# Patient Record
Sex: FEMALE | Race: WHITE | NOT HISPANIC OR LATINO | Employment: OTHER | URBAN - METROPOLITAN AREA
[De-identification: names, ages, dates, MRNs, and addresses within clinical notes are randomized per-mention and may not be internally consistent; named-entity substitution may affect disease eponyms.]

---

## 2017-01-03 ENCOUNTER — PATIENT OUTREACH (OUTPATIENT)
Dept: HEALTH INFORMATION MANAGEMENT | Facility: OTHER | Age: 82
End: 2017-01-03

## 2017-08-15 ENCOUNTER — RX ONLY (OUTPATIENT)
Age: 82
Setting detail: RX ONLY
End: 2017-08-15

## 2017-08-15 PROBLEM — C44.311 BASAL CELL CARCINOMA OF SKIN OF NOSE: Status: ACTIVE | Noted: 2017-08-15

## 2017-08-29 PROBLEM — D49.2 NEOPLASM OF UNSPECIFIED BEHAVIOR OF BONE, SOFT TISSUE, AND SKIN: Status: RESOLVED | Noted: 2017-08-15 | Resolved: 2017-08-29

## 2017-11-18 ENCOUNTER — RESOLUTE PROFESSIONAL BILLING HOSPITAL PROF FEE (OUTPATIENT)
Dept: HOSPITALIST | Facility: MEDICAL CENTER | Age: 82
End: 2017-11-18
Payer: MEDICARE

## 2017-11-18 ENCOUNTER — HOSPITAL ENCOUNTER (INPATIENT)
Facility: MEDICAL CENTER | Age: 82
LOS: 2 days | DRG: 394 | End: 2017-11-21
Attending: EMERGENCY MEDICINE | Admitting: INTERNAL MEDICINE
Payer: MEDICARE

## 2017-11-18 DIAGNOSIS — R19.7 DIARRHEA, UNSPECIFIED TYPE: ICD-10-CM

## 2017-11-18 PROBLEM — R79.89 PRERENAL AZOTEMIA: Status: ACTIVE | Noted: 2017-11-18

## 2017-11-18 LAB
ANION GAP SERPL CALC-SCNC: 9 MMOL/L (ref 0–11.9)
BASOPHILS # BLD AUTO: 0.2 % (ref 0–1.8)
BASOPHILS # BLD: 0.03 K/UL (ref 0–0.12)
BUN SERPL-MCNC: 22 MG/DL (ref 8–22)
CALCIUM SERPL-MCNC: 9.3 MG/DL (ref 8.5–10.5)
CHLORIDE SERPL-SCNC: 106 MMOL/L (ref 96–112)
CO2 SERPL-SCNC: 19 MMOL/L (ref 20–33)
CREAT SERPL-MCNC: 1.08 MG/DL (ref 0.5–1.4)
EOSINOPHIL # BLD AUTO: 0.08 K/UL (ref 0–0.51)
EOSINOPHIL NFR BLD: 0.6 % (ref 0–6.9)
ERYTHROCYTE [DISTWIDTH] IN BLOOD BY AUTOMATED COUNT: 44.5 FL (ref 35.9–50)
GFR SERPL CREATININE-BSD FRML MDRD: 47 ML/MIN/1.73 M 2
GLUCOSE SERPL-MCNC: 160 MG/DL (ref 65–99)
HCT VFR BLD AUTO: 44.4 % (ref 37–47)
HGB BLD-MCNC: 14.5 G/DL (ref 12–16)
IMM GRANULOCYTES # BLD AUTO: 0.05 K/UL (ref 0–0.11)
IMM GRANULOCYTES NFR BLD AUTO: 0.4 % (ref 0–0.9)
LYMPHOCYTES # BLD AUTO: 0.2 K/UL (ref 1–4.8)
LYMPHOCYTES NFR BLD: 1.5 % (ref 22–41)
MCH RBC QN AUTO: 29.4 PG (ref 27–33)
MCHC RBC AUTO-ENTMCNC: 32.7 G/DL (ref 33.6–35)
MCV RBC AUTO: 89.9 FL (ref 81.4–97.8)
MONOCYTES # BLD AUTO: 0.64 K/UL (ref 0–0.85)
MONOCYTES NFR BLD AUTO: 4.7 % (ref 0–13.4)
NEUTROPHILS # BLD AUTO: 12.54 K/UL (ref 2–7.15)
NEUTROPHILS NFR BLD: 92.6 % (ref 44–72)
NRBC # BLD AUTO: 0 K/UL
NRBC BLD AUTO-RTO: 0 /100 WBC
PLATELET # BLD AUTO: 363 K/UL (ref 164–446)
PMV BLD AUTO: 8.6 FL (ref 9–12.9)
POTASSIUM SERPL-SCNC: 4.4 MMOL/L (ref 3.6–5.5)
RBC # BLD AUTO: 4.94 M/UL (ref 4.2–5.4)
SODIUM SERPL-SCNC: 134 MMOL/L (ref 135–145)
WBC # BLD AUTO: 13.5 K/UL (ref 4.8–10.8)

## 2017-11-18 PROCEDURE — 700105 HCHG RX REV CODE 258: Performed by: INTERNAL MEDICINE

## 2017-11-18 PROCEDURE — 99220 PR INITIAL OBSERVATION CARE,LEVL III: CPT | Performed by: INTERNAL MEDICINE

## 2017-11-18 PROCEDURE — 36415 COLL VENOUS BLD VENIPUNCTURE: CPT

## 2017-11-18 PROCEDURE — 99285 EMERGENCY DEPT VISIT HI MDM: CPT

## 2017-11-18 PROCEDURE — 87086 URINE CULTURE/COLONY COUNT: CPT

## 2017-11-18 PROCEDURE — 302128 INFUSION PUMP: Performed by: EMERGENCY MEDICINE

## 2017-11-18 PROCEDURE — 85025 COMPLETE CBC W/AUTO DIFF WBC: CPT

## 2017-11-18 PROCEDURE — G0378 HOSPITAL OBSERVATION PER HR: HCPCS

## 2017-11-18 PROCEDURE — 80048 BASIC METABOLIC PNL TOTAL CA: CPT

## 2017-11-18 RX ORDER — ONDANSETRON 4 MG/1
4 TABLET, ORALLY DISINTEGRATING ORAL EVERY 4 HOURS PRN
Status: DISCONTINUED | OUTPATIENT
Start: 2017-11-18 | End: 2017-11-21 | Stop reason: HOSPADM

## 2017-11-18 RX ORDER — POLYETHYLENE GLYCOL 3350 17 G/17G
17 POWDER, FOR SOLUTION ORAL DAILY
COMMUNITY

## 2017-11-18 RX ORDER — AMOXICILLIN 250 MG
1 CAPSULE ORAL DAILY
COMMUNITY

## 2017-11-18 RX ORDER — AMLODIPINE BESYLATE 5 MG/1
5 TABLET ORAL EVERY MORNING
Status: DISCONTINUED | OUTPATIENT
Start: 2017-11-19 | End: 2017-11-21 | Stop reason: HOSPADM

## 2017-11-18 RX ORDER — TRIAMCINOLONE ACETONIDE 1 MG/G
1 CREAM TOPICAL DAILY
COMMUNITY
End: 2018-09-03

## 2017-11-18 RX ORDER — ACETAMINOPHEN 325 MG/1
650 TABLET ORAL EVERY 6 HOURS PRN
Status: DISCONTINUED | OUTPATIENT
Start: 2017-11-18 | End: 2017-11-21 | Stop reason: HOSPADM

## 2017-11-18 RX ORDER — SODIUM CHLORIDE 9 MG/ML
INJECTION, SOLUTION INTRAVENOUS CONTINUOUS
Status: DISCONTINUED | OUTPATIENT
Start: 2017-11-18 | End: 2017-11-21 | Stop reason: HOSPADM

## 2017-11-18 RX ORDER — CEPHALEXIN 500 MG/1
500 CAPSULE ORAL 4 TIMES DAILY
Status: ON HOLD | COMMUNITY
Start: 2017-11-18 | End: 2017-11-21

## 2017-11-18 RX ORDER — ONDANSETRON 2 MG/ML
4 INJECTION INTRAMUSCULAR; INTRAVENOUS EVERY 4 HOURS PRN
Status: DISCONTINUED | OUTPATIENT
Start: 2017-11-18 | End: 2017-11-21 | Stop reason: HOSPADM

## 2017-11-18 RX ADMIN — SODIUM CHLORIDE: 9 INJECTION, SOLUTION INTRAVENOUS at 23:43

## 2017-11-18 ASSESSMENT — CHA2DS2 SCORE
DIABETES: YES
HYPERTENSION: NO
AGE 75 OR GREATER: YES
VASCULAR DISEASE: NO
SEX: FEMALE
AGE 65 TO 74: NO
PRIOR STROKE OR TIA OR THROMBOEMBOLISM: NO
CHA2DS2 VASC SCORE: 4
CHF OR LEFT VENTRICULAR DYSFUNCTION: NO

## 2017-11-18 ASSESSMENT — PATIENT HEALTH QUESTIONNAIRE - PHQ9
1. LITTLE INTEREST OR PLEASURE IN DOING THINGS: NOT AT ALL
SUM OF ALL RESPONSES TO PHQ QUESTIONS 1-9: 0
SUM OF ALL RESPONSES TO PHQ9 QUESTIONS 1 AND 2: 0
2. FEELING DOWN, DEPRESSED, IRRITABLE, OR HOPELESS: NOT AT ALL

## 2017-11-18 ASSESSMENT — PAIN SCALES - GENERAL
PAINLEVEL_OUTOF10: 0
PAINLEVEL_OUTOF10: 0

## 2017-11-18 ASSESSMENT — LIFESTYLE VARIABLES: DO YOU DRINK ALCOHOL: NO

## 2017-11-19 PROBLEM — N17.9 ACUTE KIDNEY INJURY (HCC): Status: ACTIVE | Noted: 2017-11-19

## 2017-11-19 PROBLEM — D72.829 LEUKOCYTOSIS: Status: ACTIVE | Noted: 2017-11-19

## 2017-11-19 LAB
APPEARANCE UR: ABNORMAL
BACTERIA #/AREA URNS HPF: ABNORMAL /HPF
BASOPHILS # BLD AUTO: 0.2 % (ref 0–1.8)
BASOPHILS # BLD: 0.01 K/UL (ref 0–0.12)
BILIRUB UR QL STRIP.AUTO: NEGATIVE
COLOR UR: YELLOW
CULTURE IF INDICATED INDCX: YES UA CULTURE
EOSINOPHIL # BLD AUTO: 0.2 K/UL (ref 0–0.51)
EOSINOPHIL NFR BLD: 3.5 % (ref 0–6.9)
EPI CELLS #/AREA URNS HPF: ABNORMAL /HPF
ERYTHROCYTE [DISTWIDTH] IN BLOOD BY AUTOMATED COUNT: 45.1 FL (ref 35.9–50)
GLUCOSE UR STRIP.AUTO-MCNC: NEGATIVE MG/DL
HCT VFR BLD AUTO: 39.3 % (ref 37–47)
HGB BLD-MCNC: 12.7 G/DL (ref 12–16)
IMM GRANULOCYTES # BLD AUTO: 0.02 K/UL (ref 0–0.11)
IMM GRANULOCYTES NFR BLD AUTO: 0.4 % (ref 0–0.9)
KETONES UR STRIP.AUTO-MCNC: NEGATIVE MG/DL
LEUKOCYTE ESTERASE UR QL STRIP.AUTO: ABNORMAL
LYMPHOCYTES # BLD AUTO: 0.58 K/UL (ref 1–4.8)
LYMPHOCYTES NFR BLD: 10.2 % (ref 22–41)
MCH RBC QN AUTO: 29 PG (ref 27–33)
MCHC RBC AUTO-ENTMCNC: 32.3 G/DL (ref 33.6–35)
MCV RBC AUTO: 89.7 FL (ref 81.4–97.8)
MICRO URNS: ABNORMAL
MONOCYTES # BLD AUTO: 0.56 K/UL (ref 0–0.85)
MONOCYTES NFR BLD AUTO: 9.8 % (ref 0–13.4)
NEUTROPHILS # BLD AUTO: 4.33 K/UL (ref 2–7.15)
NEUTROPHILS NFR BLD: 75.9 % (ref 44–72)
NITRITE UR QL STRIP.AUTO: NEGATIVE
NRBC # BLD AUTO: 0 K/UL
NRBC BLD AUTO-RTO: 0 /100 WBC
PH UR STRIP.AUTO: 5 [PH]
PLATELET # BLD AUTO: 354 K/UL (ref 164–446)
PMV BLD AUTO: 8.8 FL (ref 9–12.9)
PROT UR QL STRIP: >=300 MG/DL
RBC # BLD AUTO: 4.38 M/UL (ref 4.2–5.4)
RBC # URNS HPF: ABNORMAL /HPF
RBC UR QL AUTO: ABNORMAL
RENAL EPI CELLS #/AREA URNS HPF: ABNORMAL /HPF
SP GR UR STRIP.AUTO: 1.01
UROBILINOGEN UR STRIP.AUTO-MCNC: 0.2 MG/DL
WBC # BLD AUTO: 5.7 K/UL (ref 4.8–10.8)
WBC #/AREA URNS HPF: ABNORMAL /HPF

## 2017-11-19 PROCEDURE — A9270 NON-COVERED ITEM OR SERVICE: HCPCS | Performed by: INTERNAL MEDICINE

## 2017-11-19 PROCEDURE — 99232 SBSQ HOSP IP/OBS MODERATE 35: CPT | Performed by: INTERNAL MEDICINE

## 2017-11-19 PROCEDURE — 81001 URINALYSIS AUTO W/SCOPE: CPT

## 2017-11-19 PROCEDURE — 700102 HCHG RX REV CODE 250 W/ 637 OVERRIDE(OP): Performed by: INTERNAL MEDICINE

## 2017-11-19 PROCEDURE — 85025 COMPLETE CBC W/AUTO DIFF WBC: CPT

## 2017-11-19 PROCEDURE — 770021 HCHG ROOM/CARE - ISO PRIVATE

## 2017-11-19 PROCEDURE — 700105 HCHG RX REV CODE 258: Performed by: INTERNAL MEDICINE

## 2017-11-19 PROCEDURE — 36415 COLL VENOUS BLD VENIPUNCTURE: CPT

## 2017-11-19 PROCEDURE — 87086 URINE CULTURE/COLONY COUNT: CPT

## 2017-11-19 RX ADMIN — AMLODIPINE BESYLATE 5 MG: 5 TABLET ORAL at 09:34

## 2017-11-19 RX ADMIN — SODIUM CHLORIDE: 9 INJECTION, SOLUTION INTRAVENOUS at 11:45

## 2017-11-19 RX ADMIN — APIXABAN 2.5 MG: 2.5 TABLET, FILM COATED ORAL at 09:34

## 2017-11-19 RX ADMIN — APIXABAN 2.5 MG: 2.5 TABLET, FILM COATED ORAL at 21:07

## 2017-11-19 ASSESSMENT — PAIN SCALES - GENERAL
PAINLEVEL_OUTOF10: 0

## 2017-11-19 ASSESSMENT — CHA2DS2 SCORE
PRIOR STROKE OR TIA OR THROMBOEMBOLISM: NO
AGE 65 TO 74: NO
DIABETES: NO
CHF OR LEFT VENTRICULAR DYSFUNCTION: NO
AGE 75 OR GREATER: YES
SEX: FEMALE
HYPERTENSION: YES
CHA2DS2 VASC SCORE: 4
VASCULAR DISEASE: NO

## 2017-11-19 NOTE — ASSESSMENT & PLAN NOTE
- Was recently on antibiotics  - Few bacteria but does have white blood cells as well as leukocyte esterase  - Mentation does seem improved, likely near her baseline, she has a normal white blood cell count and is afebrile  - Was having diarrhea with concern for C. diff so did not start any antibiotics, at this point in time I don't think antibiotics are urgently required, will hold off, if culture is positive start antibiotics then

## 2017-11-19 NOTE — PROGRESS NOTES
Pt is a&o to self. Pt is currently stable in room and is on contact for C. Diff until it is ruled out. Pt is able to guidry & follow simple commands. PERRLA. Pt turns frequently turns in bed. Clear lung sounds.

## 2017-11-19 NOTE — ASSESSMENT & PLAN NOTE
- On recent antibiotics, so initially concerning for C. Diff  - However patient was also on stool softeners  -Patient has not had a single bowel movement since she's been here, certainly does not have the exam is insistent with toxic megacolon so therefore no C. Diff  - Likely secondary to excessive stool softeners

## 2017-11-19 NOTE — PROGRESS NOTES
Patient arrived to unit on a stretcher. Skin check performed with Charge nurse. Pt has fragile, thin skin. Pt has a cut on the L posterior lower calf with blanchable redness around the site. Sacrum intact.

## 2017-11-19 NOTE — CARE PLAN
Problem: Safety  Goal: Will remain free from falls    Intervention: Assess risk factors for falls  Goal partially met with bed alarm on.       Problem: Knowledge Deficit  Goal: Knowledge of disease process/condition, treatment plan, diagnostic tests, and medications will improve    Intervention: Assess knowledge level of disease process/condition, treatment plan, diagnostic tests, and medications  Goal not met.

## 2017-11-19 NOTE — H&P
Hospital Medicine History and Physical    Date of Service  11/18/2017    Chief Complaint  Chief Complaint   Patient presents with   • Diarrhea   • Nausea       History of Presenting Illness  89 y.o. female  Who resides at [facility] who presented 11/18/2017 with diarrhea. Patient was recently started on Kelfex for UTI, no UA or culture data accompanies her. [facility] will not accept patient back unless C dif can be r/o.  It is noted that patient is on Multiple laxatives. She is pleasantly demented, denies physical complaints, thinks she is in the [place] with her parents currently.    Primary Care Physician  Denver J Miller, M.D.    Consultants  none    Code Status  DNR per POLST    Review of Systems  Review of Systems   Unable to perform ROS: Dementia        Past Medical History  Past Medical History:   Diagnosis Date   • Acute renal failure (ARF) (CMS-HCC) 3/1/2015   • UTI (urinary tract infection) 2/19/2015   • Diabetes mellitus type 2 in obese (CMS-HCC) 8/15/2013   • Cancer (CMS-HCC)     skin Ca removed   • Dementia, old age    • Hearing disorder    • Hypertension    • Macular degeneration    • MEDICAL HOME        Surgical History  Past Surgical History:   Procedure Laterality Date   • HIP HEMIARTHROPLASTY  2/21/2015    Performed by Brian Calixto M.D. at SURGERY [facility] ORS   • HYSTERECTOMY, TOTAL ABDOMINAL     • OTHER      TONSILLS, EAR SURG   • OTHER ABDOMINAL SURGERY         Medications  No current facility-administered medications on file prior to encounter.      Current Outpatient Prescriptions on File Prior to Encounter   Medication Sig Dispense Refill   • apixaban (ELIQUIS) 2.5mg Tab Take 2.5 mg by mouth 2 Times a Day.     • famotidine (PEPCID) 20 MG Tab Take 20 mg by mouth every day.     • Calcium Carb-Cholecalciferol (OYSTER SHELL CALCIUM/VITAMIN D) 250-125 MG-UNIT Tab tablet Take 1 Tab by mouth 2 Times a Day.     • vitamin D (CHOLECALCIFEROL) 1000 UNIT Tab Take 2,000 Units by mouth every  morning.     • lisinopril (PRINIVIL, ZESTRIL) 30 MG tablet Take 30 mg by mouth every morning.     • amlodipine (NORVASC) 5 MG TABS Take 5 mg by mouth every morning.     • Multiple Vitamins-Minerals (OCUVITE) TABS Take 1 Tab by mouth every morning.         Family History  Family History   Problem Relation Age of Onset   • Cancer Father    • Psychiatry Brother        Social History  Social History   Substance Use Topics   • Smoking status: Never Smoker   • Smokeless tobacco: Never Used   • Alcohol use No       Allergies  Allergies   Allergen Reactions   • Sulfa Drugs         Physical Exam  Laboratory   Hemodynamics  Temp (24hrs), Av.6 °C (99.7 °F), Min:37.6 °C (99.7 °F), Max:37.6 °C (99.7 °F)   Temperature: 37.6 °C (99.7 °F)  Pulse  Av.9  Min: 68  Max: 79 Heart Rate (Monitored): 68  Blood Pressure : 119/73, NIBP: (!) 97/39      Respiratory      Respiration: 14, Pulse Oximetry: 91 %             Physical Exam   Constitutional: She appears well-developed. No distress.   Frail, thin   HENT:   Head: Normocephalic and atraumatic.   Mouth/Throat: Oropharynx is clear and moist.   Eyes: EOM are normal. Pupils are equal, round, and reactive to light. Right eye exhibits no discharge. Left eye exhibits no discharge. No scleral icterus.   Neck: Neck supple.   Cardiovascular: Normal rate.    irreg   Pulmonary/Chest: Effort normal and breath sounds normal.   Abdominal: Soft. Bowel sounds are normal. She exhibits no distension. There is no tenderness.   Musculoskeletal: She exhibits no edema or tenderness.   Neurological: She is alert. No cranial nerve deficit.   Skin: Skin is warm and dry. She is not diaphoretic.   Psychiatric:   Pleasant, calm   Nursing note and vitals reviewed.      Recent Labs      17   WBC  13.5*   RBC  4.94   HEMOGLOBIN  14.5   HEMATOCRIT  44.4   MCV  89.9   MCH  29.4   MCHC  32.7*   RDW  44.5   PLATELETCT  363   MPV  8.6*     Recent Labs      17   SODIUM  134*   POTASSIUM   4.4   CHLORIDE  106   CO2  19*   GLUCOSE  160*   BUN  22   CREATININE  1.08   CALCIUM  9.3     Recent Labs      11/18/17   1910   GLUCOSE  160*                 Lab Results   Component Value Date    TROPONINI <0.01 12/23/2016     Urinalysis:    Lab Results  Component Value Date/Time   SPECGRAVITY 1.022 12/23/2016 2045   GLUCOSEUR Negative 12/23/2016 2045   KETONES Negative 12/23/2016 2045   NITRITE Negative 12/23/2016 2045   WBCURINE 2-5 12/23/2016 2045   RBCURINE 5-10 (A) 12/23/2016 2045   BACTERIA Few (A) 12/23/2016 2045   EPITHELCELL Few 12/23/2016 2045        Imaging  none   Assessment/Plan     I anticipate this patient is appropriate for observation status at this time.    Paroxysmal atrial fibrillation (HCC)- (present on admission)   Assessment & Plan    Rate OK        Prerenal azotemia- (present on admission)   Assessment & Plan    Hydrate cautiously        Diarrhea- (present on admission)   Assessment & Plan    c dif pending  Stool softeners and H2 blocker held  atb held           UTI (urinary tract infection)- (present on admission)   Assessment & Plan    Check culture        Dementia- (present on admission)   Assessment & Plan    stable            VTE prophylaxis: Eliquis.

## 2017-11-19 NOTE — PROGRESS NOTES
Pt A&Ox2 (disoriented to time and event), denies any numbness/tingling/pain.    Bed alarm on, call light within reach, pt educated on importance of using the call light when she needs assistance, pt verbalized understanding.

## 2017-11-19 NOTE — ED NOTES
Pt BIB EMS from [facility], in memory care area with c/o diarrhea starting 2 hours ago.  Pt is on Keflex for UTI started 3 days ago and also was dx for yeast infection to her leg.  No c/o abd pain.  Pt notes some nausea.  Pt A/o x1, baseline for pt.  ERP to see.

## 2017-11-19 NOTE — ED NOTES
Pt encouraged to walk to the bathroom, when standing pt states she feels too weak to ambulate, returned to bed.

## 2017-11-19 NOTE — ED PROVIDER NOTES
"ED Provider Note    CHIEF COMPLAINT  Chief Complaint   Patient presents with   • Diarrhea   • Nausea       HPI  Daysi Farmer is a 89 y.o. female who presents For evaluation of diarrhea for the past couple hours, sent by a nursing home where she resides with dementia. The patient is on Keflex for urinary tract infection, she is pleasantly demented and unable to provide any useful information at this time.     REVIEW OF SYSTEMS  Unobtainable secondary to clinical condition.      PAST MEDICAL HISTORY  Past Medical History:   Diagnosis Date   • Acute renal failure (ARF) (CMS-HCC) 3/1/2015   • Cancer (CMS-HCC)     skin Ca removed   • Dementia, old age    • Diabetes mellitus type 2 in obese (CMS-HCC) 8/15/2013   • Hearing disorder    • Hypertension    • Macular degeneration    • MEDICAL HOME    • UTI (urinary tract infection) 2/19/2015       FAMILY HISTORY  Family History   Problem Relation Age of Onset   • Cancer Father    • Psychiatry Brother        SOCIAL HISTORY  Social History   Substance Use Topics   • Smoking status: Never Smoker   • Smokeless tobacco: Never Used   • Alcohol use No       SURGICAL HISTORY  Past Surgical History:   Procedure Laterality Date   • HIP HEMIARTHROPLASTY  2/21/2015    Performed by Brian Calixto M.D. at SURGERY [facility] ORS   • HYSTERECTOMY, TOTAL ABDOMINAL     • OTHER      TONSILLS, EAR SURG   • OTHER ABDOMINAL SURGERY         CURRENT MEDICATIONS  I personally reviewed the medication list in the charting documentation.     ALLERGIES  Allergies   Allergen Reactions   • Sulfa Drugs        MEDICAL RECORD  I have reviewed patient's medical record and pertinent results are listed above.      PHYSICAL EXAM  VITAL SIGNS: /73   Pulse 77   Temp 37.6 °C (99.7 °F)   Resp 16   Ht 1.626 m (5' 4\")   Wt 59 kg (130 lb)   LMP 12/24/1960   SpO2 94%   BMI 22.31 kg/m²    Constitutional:Elderly and frail but overall well-appearing, no acute distress, quite pleasant.  HENT: Mucus " membranes moist.    Eyes: No scleral icterus. Normal conjunctiva   Neck: Supple, comfortable, nonpainful range of motion.   Cardiovascular: Regular heart rate and rhythm.   Thorax & Lungs: Chest is nontender.  Lungs are clear to auscultation with good air movement bilaterally.  No wheeze, rhonchi, nor rales.   Abdomen: Soft, with no tenderness, rebound nor guarding.  No mass or pulsatile mass appreciated.  Skin: Warm, dry. No rash appreciated  Extremities/Musculoskeletal: No sign of trauma. No asymmetric calf tenderness, erythema or edema. Normal range of motion   Neurologic: Alert & disoriented. His focal deficits.    DIAGNOSTIC STUDIES / PROCEDURES    LABS  Results for orders placed or performed during the hospital encounter of 11/18/17   CBC WITH DIFFERENTIAL   Result Value Ref Range    WBC 13.5 (H) 4.8 - 10.8 K/uL    RBC 4.94 4.20 - 5.40 M/uL    Hemoglobin 14.5 12.0 - 16.0 g/dL    Hematocrit 44.4 37.0 - 47.0 %    MCV 89.9 81.4 - 97.8 fL    MCH 29.4 27.0 - 33.0 pg    MCHC 32.7 (L) 33.6 - 35.0 g/dL    RDW 44.5 35.9 - 50.0 fL    Platelet Count 363 164 - 446 K/uL    MPV 8.6 (L) 9.0 - 12.9 fL    Neutrophils-Polys 92.60 (H) 44.00 - 72.00 %    Lymphocytes 1.50 (L) 22.00 - 41.00 %    Monocytes 4.70 0.00 - 13.40 %    Eosinophils 0.60 0.00 - 6.90 %    Basophils 0.20 0.00 - 1.80 %    Immature Granulocytes 0.40 0.00 - 0.90 %    Nucleated RBC 0.00 /100 WBC    Neutrophils (Absolute) 12.54 (H) 2.00 - 7.15 K/uL    Lymphs (Absolute) 0.20 (L) 1.00 - 4.80 K/uL    Monos (Absolute) 0.64 0.00 - 0.85 K/uL    Eos (Absolute) 0.08 0.00 - 0.51 K/uL    Baso (Absolute) 0.03 0.00 - 0.12 K/uL    Immature Granulocytes (abs) 0.05 0.00 - 0.11 K/uL    NRBC (Absolute) 0.00 K/uL   BASIC METABOLIC PANEL   Result Value Ref Range    Sodium 134 (L) 135 - 145 mmol/L    Potassium 4.4 3.6 - 5.5 mmol/L    Chloride 106 96 - 112 mmol/L    Co2 19 (L) 20 - 33 mmol/L    Glucose 160 (H) 65 - 99 mg/dL    Bun 22 8 - 22 mg/dL    Creatinine 1.08 0.50 - 1.40 mg/dL     Calcium 9.3 8.5 - 10.5 mg/dL    Anion Gap 9.0 0.0 - 11.9   ESTIMATED GFR   Result Value Ref Range    GFR If  57 (A) >60 mL/min/1.73 m 2    GFR If Non  47 (A) >60 mL/min/1.73 m 2         COURSE & MEDICAL DECISION MAKING  I have reviewed any medical record information, laboratory studies and radiographic results as noted above.  Differential diagnoses includes: Dehydration, electro-lyte abnormalities, C. diff, medication reaction    Encounter Summary: This is a 89 y.o. female with diarrhea after starting Keflex for UTI, sent here by her memory [facility] nursing home, she is unable to provide much history secondary to her dementia. She looks well overall on exam. We'll check blood work. Will also have our nursing staff contact the nursing staff at her nursing home for further information about the patient ------ nursing home reports that given the risk of C. diff colitis and unable to take her back until PCR is negative or she is being treated for C. diff. Will be admitted for further evaluation.      DISPOSITION: Admitted in guarded condition      FINAL IMPRESSION  1. Diarrhea, unspecified type           This dictation was created using voice recognition software. The accuracy of the dictation is limited to the abilities of the software. I expect there may be some errors of grammar and possibly content. The nursing notes were reviewed and certain aspects of this information were incorporated into this note.    Electronically signed by: Ernesto Ibarra, 11/18/2017 6:59 PM

## 2017-11-19 NOTE — PROGRESS NOTES
[facility] Hospitalist Progress Note    Date of Service: 2017    Chief Complaint  89 y.o. female admitted 2017 with diarrhea.    Interval Problem Update  Patient resides at a memory care facility, she does not remember what brought here to the hospital.  Patient does have confusion at baseline from her dementia, unknown baseline.  C. diff sample and urinalysis pending.  Discussed plan of patient condition with nurse at bedside.    Consultants/Specialty  None    Disposition  Patient requires additional treatment in the hospital, hopefully will be able to go back to her memory care upon discharge        Review of Systems   Unable to perform ROS: Dementia      Physical Exam  Laboratory/Imaging   Hemodynamics  Temp (24hrs), Av.6 °C (97.9 °F), Min:36 °C (96.8 °F), Max:37.6 °C (99.7 °F)   Temperature: 36.4 °C (97.6 °F)  Pulse  Av.6  Min: 68  Max: 86 Heart Rate (Monitored): 71  Blood Pressure : 121/44, NIBP: 118/40      Respiratory      Respiration: 12, Pulse Oximetry: 93 %             Fluids  No intake or output data in the 24 hours ending 17 0728    Nutrition  Orders Placed This Encounter   Procedures   • Diet Order     Standing Status:   Standing     Number of Occurrences:   1     Order Specific Question:   Diet:     Answer:   Consistent Carbohydrate [4]     Order Specific Question:   Texture/Fiber modifications:     Answer:   Dysphagia 3(Mechanical Soft)specify fluid consistency(question 6) [3]     Order Specific Question:   Consistency/Fluid modifications:     Answer:   Thin Liquids [3]     Physical Exam   Constitutional: No distress.   Frail appearing    HENT:   Head: Normocephalic and atraumatic.   Mouth/Throat: No oropharyngeal exudate.   Eyes: Right eye exhibits no discharge. Left eye exhibits no discharge.   Neck: Neck supple. No tracheal deviation present.   Cardiovascular: Normal rate and regular rhythm.  Exam reveals no gallop and no friction rub.    No murmur heard.  Pulmonary/Chest:  Effort normal and breath sounds normal. No stridor. No respiratory distress. She has no wheezes. She has no rales.   Abdominal: Soft. Bowel sounds are normal. She exhibits no distension.   Musculoskeletal: She exhibits no edema.   Lymphadenopathy:     She has no cervical adenopathy.   Neurological:   Awake but confused    Skin: Skin is warm and dry. No rash noted. She is not diaphoretic. No erythema.   Psychiatric: She is slowed. Cognition and memory are impaired.   Nursing note and vitals reviewed.      Recent Labs      11/18/17 1910   WBC  13.5*   RBC  4.94   HEMOGLOBIN  14.5   HEMATOCRIT  44.4   MCV  89.9   MCH  29.4   MCHC  32.7*   RDW  44.5   PLATELETCT  363   MPV  8.6*     Recent Labs      11/18/17 1910   SODIUM  134*   POTASSIUM  4.4   CHLORIDE  106   CO2  19*   GLUCOSE  160*   BUN  22   CREATININE  1.08   CALCIUM  9.3                      Assessment/Plan     Diarrhea- (present on admission)   Assessment & Plan    - On recent antibiotics, concerning for C. Diff  - However patient was also on stool softeners  - Await C. diff culture, if she does have worsening start oral vancomycin          Paroxysmal atrial fibrillation (HCC)- (present on admission)   Assessment & Plan    - Currently normal sinus rhythm        Leukocytosis   Assessment & Plan    - Concerning for infectious cause of her diarrhea but could also be reactive  - Repeat CBC in the morning        Acute kidney injury (CMS-HCC)   Assessment & Plan    - Mild, likely due to dehydration in patient with diarrhea  - Continue IV fluids  - Repeat BMP in the morning        UTI (urinary tract infection)- (present on admission)   Assessment & Plan    - Awaiting urinalysis as well as urine culture if indicated  - Was recently on antibiotics        Dementia- (present on admission)   Assessment & Plan    - Unknown baseline but this is probably near her baseline, does reside in a memory care facility        Hyponatremia- (present on admission)   Assessment &  Plan    - Mild, likely due to dehydration, continue IV fluids, repeat BMP in the morning            Reviewed items::  Labs reviewed, Medications reviewed and Radiology images reviewed  DVT: eliquis

## 2017-11-19 NOTE — PROGRESS NOTES
Dr. Meyer notified that pt had a MEWS score of 4 this morning but the respiration and pulse were retaken and MEWS score dropped to 2. No further instructions given.

## 2017-11-20 PROBLEM — R80.9 PROTEINURIA: Status: ACTIVE | Noted: 2017-11-20

## 2017-11-20 LAB
ALBUMIN SERPL BCP-MCNC: 3 G/DL (ref 3.2–4.9)
ALBUMIN/GLOB SERPL: 1 G/DL
ALP SERPL-CCNC: 66 U/L (ref 30–99)
ALT SERPL-CCNC: 5 U/L (ref 2–50)
ANION GAP SERPL CALC-SCNC: 7 MMOL/L (ref 0–11.9)
AST SERPL-CCNC: 10 U/L (ref 12–45)
BILIRUB SERPL-MCNC: 0.4 MG/DL (ref 0.1–1.5)
BUN SERPL-MCNC: 10 MG/DL (ref 8–22)
CALCIUM SERPL-MCNC: 8.8 MG/DL (ref 8.5–10.5)
CHLORIDE SERPL-SCNC: 110 MMOL/L (ref 96–112)
CO2 SERPL-SCNC: 20 MMOL/L (ref 20–33)
CREAT SERPL-MCNC: 0.61 MG/DL (ref 0.5–1.4)
GFR SERPL CREATININE-BSD FRML MDRD: >60 ML/MIN/1.73 M 2
GLOBULIN SER CALC-MCNC: 3.1 G/DL (ref 1.9–3.5)
GLUCOSE SERPL-MCNC: 90 MG/DL (ref 65–99)
POTASSIUM SERPL-SCNC: 3.7 MMOL/L (ref 3.6–5.5)
PROT SERPL-MCNC: 6.1 G/DL (ref 6–8.2)
SODIUM SERPL-SCNC: 137 MMOL/L (ref 135–145)

## 2017-11-20 PROCEDURE — 700105 HCHG RX REV CODE 258: Performed by: INTERNAL MEDICINE

## 2017-11-20 PROCEDURE — 80053 COMPREHEN METABOLIC PANEL: CPT

## 2017-11-20 PROCEDURE — A9270 NON-COVERED ITEM OR SERVICE: HCPCS | Performed by: INTERNAL MEDICINE

## 2017-11-20 PROCEDURE — 770001 HCHG ROOM/CARE - MED/SURG/GYN PRIV*

## 2017-11-20 PROCEDURE — 99232 SBSQ HOSP IP/OBS MODERATE 35: CPT | Performed by: INTERNAL MEDICINE

## 2017-11-20 PROCEDURE — 700102 HCHG RX REV CODE 250 W/ 637 OVERRIDE(OP): Performed by: INTERNAL MEDICINE

## 2017-11-20 PROCEDURE — 36415 COLL VENOUS BLD VENIPUNCTURE: CPT

## 2017-11-20 RX ORDER — HYDRALAZINE HYDROCHLORIDE 20 MG/ML
20 INJECTION INTRAMUSCULAR; INTRAVENOUS EVERY 6 HOURS PRN
Status: DISCONTINUED | OUTPATIENT
Start: 2017-11-20 | End: 2017-11-21 | Stop reason: HOSPADM

## 2017-11-20 RX ADMIN — SODIUM CHLORIDE: 9 INJECTION, SOLUTION INTRAVENOUS at 00:52

## 2017-11-20 RX ADMIN — ACETAMINOPHEN 650 MG: 325 TABLET, FILM COATED ORAL at 12:21

## 2017-11-20 RX ADMIN — AMLODIPINE BESYLATE 5 MG: 5 TABLET ORAL at 09:02

## 2017-11-20 RX ADMIN — APIXABAN 2.5 MG: 2.5 TABLET, FILM COATED ORAL at 09:02

## 2017-11-20 RX ADMIN — APIXABAN 2.5 MG: 2.5 TABLET, FILM COATED ORAL at 20:36

## 2017-11-20 RX ADMIN — SODIUM CHLORIDE: 9 INJECTION, SOLUTION INTRAVENOUS at 16:17

## 2017-11-20 ASSESSMENT — PAIN SCALES - GENERAL
PAINLEVEL_OUTOF10: 0
PAINLEVEL_OUTOF10: 3
PAINLEVEL_OUTOF10: 0

## 2017-11-20 NOTE — CARE PLAN
Problem: Bowel/Gastric:  Goal: Will not experience complications related to bowel motility  Outcome: PROGRESSING AS EXPECTED  Pt. Has not had loose stool.     Problem: Skin Integrity  Goal: Risk for impaired skin integrity will decrease  Outcome: PROGRESSING AS EXPECTED  Pt. Q2 turns. States she is unable to mobilize and typically has assistance or is in a wheelchair. Pt. Is incontinent. No signs of incontinence associated dermatitis at this time.

## 2017-11-20 NOTE — PROGRESS NOTES
Pt. A/Ox1, only to self. Needing continual orientation. Denies pain, states some discomfort. IV intact. Poc discussed, bed alarm on, call light within reach.

## 2017-11-20 NOTE — CARE PLAN
Problem: Communication  Goal: The ability to communicate needs accurately and effectively will improve  Pt and pt's family updated on pt's plan of care.    Problem: Skin Integrity  Goal: Risk for impaired skin integrity will decrease  Q2H turns implemented, waffle mattress in place, pt routinely monitored for episodes of incontinence and promptly cleaned up when needed, barrier wipes used.

## 2017-11-20 NOTE — PROGRESS NOTES
When pt was being assessed at [facility], pt stated to RN that she did not feel well. RN took pt's vital signs and SBP was 176. Pt stated she is not in severe pain but she is in pain.. She said pain is 3/10. RN talked to HENRI Tello to notified him about pt's SBP and generalized pain. Per HENRI Tello, he will add PRN antihypertensive drug and add more pain medications for pt. RN retook pt's vitals and SBP is now at 157. Pt still stated that pain is 3/10. Pain medication provided.

## 2017-11-20 NOTE — PROGRESS NOTES
[facility] Hospitalist Progress Note    Date of Service: 2017    Chief Complaint  89 y.o. female admitted 2017 with diarrhea.    Interval Problem Update  Patient resides at a memory care facility, she does not remember what brought here to the hospital.  Patient does have confusion at baseline from her dementia, unknown baseline.  No bowel movement since she's been here, not C. Diff.  Urine culture pending.  Discussed plan of patient condition with nurse at bedside.    Consultants/Specialty  None    Disposition  Patient requires additional treatment in the hospital, hopefully will be able to go back to her memory care upon discharge        Review of Systems   Unable to perform ROS: Dementia      Physical Exam  Laboratory/Imaging   Hemodynamics  Temp (24hrs), Av.6 °C (97.9 °F), Min:36.4 °C (97.6 °F), Max:36.9 °C (98.5 °F)   Temperature: 36.5 °C (97.7 °F)  Pulse  Av.7  Min: 59  Max: 86    Blood Pressure : 150/56      Respiratory      Respiration: 16, Pulse Oximetry: 96 %        RUL Breath Sounds: Clear, RML Breath Sounds: Diminished, RLL Breath Sounds: Clear, PEGGY Breath Sounds: Clear, LLL Breath Sounds: Diminished    Fluids    Intake/Output Summary (Last 24 hours) at 17 0750  Last data filed at 17 0600   Gross per 24 hour   Intake              300 ml   Output                0 ml   Net              300 ml       Nutrition  Orders Placed This Encounter   Procedures   • Diet Order     Standing Status:   Standing     Number of Occurrences:   1     Order Specific Question:   Diet:     Answer:   Consistent Carbohydrate [4]     Order Specific Question:   Texture/Fiber modifications:     Answer:   Dysphagia 3(Mechanical Soft)specify fluid consistency(question 6) [3]     Order Specific Question:   Consistency/Fluid modifications:     Answer:   Thin Liquids [3]     Physical Exam   Constitutional:   Frail appearing    HENT:   Head: Normocephalic and atraumatic.   Eyes: Right eye exhibits no discharge.  Left eye exhibits no discharge. No scleral icterus.   Neck: Neck supple.   Cardiovascular: Normal rate and regular rhythm.    No murmur heard.  Pulmonary/Chest: Effort normal and breath sounds normal. No stridor. No respiratory distress. She has no wheezes.   Abdominal: Soft. Bowel sounds are normal. She exhibits no distension. There is no tenderness.   Musculoskeletal: She exhibits no edema.   Lymphadenopathy:     She has no cervical adenopathy.   Neurological:   Awake but confused    Skin: Skin is warm and dry. She is not diaphoretic. No erythema.   Psychiatric: She is slowed. Cognition and memory are impaired.   Nursing note and vitals reviewed.      Recent Labs      11/18/17 1910 11/19/17   0840   WBC  13.5*  5.7   RBC  4.94  4.38   HEMOGLOBIN  14.5  12.7   HEMATOCRIT  44.4  39.3   MCV  89.9  89.7   MCH  29.4  29.0   MCHC  32.7*  32.3*   RDW  44.5  45.1   PLATELETCT  363  354   MPV  8.6*  8.8*     Recent Labs      11/18/17 1910   SODIUM  134*   POTASSIUM  4.4   CHLORIDE  106   CO2  19*   GLUCOSE  160*   BUN  22   CREATININE  1.08   CALCIUM  9.3                      Assessment/Plan     Diarrhea- (present on admission)   Assessment & Plan    - On recent antibiotics, so initially concerning for C. Diff  - However patient was also on stool softeners  -Patient has not had a single bowel movement since she's been here, certainly does not have the exam is insistent with toxic megacolon so therefore no C. Diff  - Likely secondary to excessive stool softeners          Paroxysmal atrial fibrillation (HCC)- (present on admission)   Assessment & Plan    - Currently normal sinus rhythm        UTI (urinary tract infection)- (present on admission)   Assessment & Plan    - Was recently on antibiotics  - Few bacteria but does have white blood cells as well as leukocyte esterase  - Mentation does seem improved, likely near her baseline, she has a normal white blood cell count and is afebrile  - Was having diarrhea with  concern for C. diff so did not start any antibiotics, at this point in time I don't think antibiotics are urgently required, will hold off, if culture is positive start antibiotics then        Proteinuria   Assessment & Plan    - Significant, albumin is 3  - Kidney function currently seems normal otherwise, occasionally monitor for proteinuria, patient would not benefit from a biopsy or any significant additional workup        Leukocytosis   Assessment & Plan    -Resolved, afebrile        Acute kidney injury (CMS-HCC)   Assessment & Plan    - Resolved with IV fluids         Dementia- (present on admission)   Assessment & Plan    - Unknown baseline but this is probably near her baseline, does reside in a memory care facility        Hyponatremia- (present on admission)   Assessment & Plan    -Resolved with IV fluids will            Reviewed items::  Labs reviewed, Medications reviewed and Radiology images reviewed  DVT: eliquis

## 2017-11-20 NOTE — ASSESSMENT & PLAN NOTE
- Significant, albumin is 3  - Kidney function currently seems normal otherwise, occasionally monitor for proteinuria, patient would not benefit from a biopsy or any significant additional workup

## 2017-11-20 NOTE — PROGRESS NOTES
Pt A&Ox2 (disoriented to time and event), denies any numbness/tingling/pain.    Bed alarm on, call light within reach, pt educated on importance of using the call light when he needs assistance, pt verbalized understanding.

## 2017-11-21 VITALS
WEIGHT: 115 LBS | HEIGHT: 64 IN | TEMPERATURE: 99.3 F | OXYGEN SATURATION: 97 % | DIASTOLIC BLOOD PRESSURE: 65 MMHG | HEART RATE: 98 BPM | BODY MASS INDEX: 19.63 KG/M2 | RESPIRATION RATE: 20 BRPM | SYSTOLIC BLOOD PRESSURE: 144 MMHG

## 2017-11-21 PROBLEM — N17.9 ACUTE KIDNEY INJURY (HCC): Status: RESOLVED | Noted: 2017-11-19 | Resolved: 2017-11-21

## 2017-11-21 PROBLEM — R19.7 DIARRHEA: Status: RESOLVED | Noted: 2017-11-18 | Resolved: 2017-11-21

## 2017-11-21 PROBLEM — R80.9 PROTEINURIA: Status: RESOLVED | Noted: 2017-11-20 | Resolved: 2017-11-21

## 2017-11-21 PROBLEM — D72.829 LEUKOCYTOSIS: Status: RESOLVED | Noted: 2017-11-19 | Resolved: 2017-11-21

## 2017-11-21 LAB
BACTERIA UR CULT: NORMAL
SIGNIFICANT IND 70042: NORMAL
SITE SITE: NORMAL
SOURCE SOURCE: NORMAL

## 2017-11-21 PROCEDURE — 700102 HCHG RX REV CODE 250 W/ 637 OVERRIDE(OP): Performed by: INTERNAL MEDICINE

## 2017-11-21 PROCEDURE — A9270 NON-COVERED ITEM OR SERVICE: HCPCS | Performed by: INTERNAL MEDICINE

## 2017-11-21 PROCEDURE — 99239 HOSP IP/OBS DSCHRG MGMT >30: CPT | Performed by: HOSPITALIST

## 2017-11-21 RX ADMIN — APIXABAN 2.5 MG: 2.5 TABLET, FILM COATED ORAL at 07:55

## 2017-11-21 RX ADMIN — AMLODIPINE BESYLATE 5 MG: 5 TABLET ORAL at 07:55

## 2017-11-21 ASSESSMENT — LIFESTYLE VARIABLES: EVER_SMOKED: NEVER

## 2017-11-21 ASSESSMENT — PAIN SCALES - GENERAL: PAINLEVEL_OUTOF10: 0

## 2017-11-21 NOTE — PROGRESS NOTES
Received report and assumed pt care.  A&O x1.  Bed alarm and treaded socks on.  Call light and personal belongings within reach.  Denies any pain or discomfort.

## 2017-11-21 NOTE — PROGRESS NOTES
RN discussed plan of care with pt's daughter when it was mentioned to her that pt was being treated with Keflex for UTI at the nursing home. Daughter told RN that she was told that the Keflex was for pt's skin tear in her left leg and there was no mention of UTI. Pt's daughter stated she will call and clarify this with the nursing home.

## 2017-11-21 NOTE — CARE PLAN
Problem: Urinary Elimination:  Goal: Ability to reestablish a normal urinary elimination pattern will improve  Outcome: PROGRESSING AS EXPECTED  Pt incontinent of urine.      Problem: Pain Management  Goal: Pain level will decrease to patient's comfort goal  Outcome: PROGRESSING AS EXPECTED  Pt denies pain at this time.

## 2017-11-21 NOTE — DISCHARGE PLANNING
Received transport form from Michelle(PRESTON). Arranged patient's transport to the [facility] at 1630 via [facility]. Michelle(PRESTON) notified.

## 2017-11-21 NOTE — PROGRESS NOTES
2 RN skin check completed.  No medical devices in place.  No pressure ulcers present.  Interventions in place include Q2 turns and waffle cushion.

## 2017-11-21 NOTE — DISCHARGE SUMMARY
CHIEF COMPLAINT ON ADMISSION  Chief Complaint   Patient presents with   • Diarrhea   • Nausea       CODE STATUS  DNR    HPI & HOSPITAL COURSE  This is a 89 y.o. female here with diarrhea, which resolved almost practically on admission. She was followed closely and she had no urinary symptoms. Stool softeners and antibiotics were held. She was much improved to her baseline.      Therefore, she is discharged in fair and stable condition with close outpatient follow-up.    SPECIFIC OUTPATIENT FOLLOW-UP  Resume stool softeners cautiously    DISCHARGE PROBLEM LIST  Active Problems:    Paroxysmal atrial fibrillation (HCC) POA: Yes    Dementia POA: Yes  Resolved Problems:    Diarrhea POA: Yes    UTI (urinary tract infection) POA: Yes    Hyponatremia POA: Yes    Acute kidney injury (CMS-HCC) POA: Yes    Leukocytosis POA: Yes    Proteinuria POA: Yes      FOLLOW UP  No future appointments.  No follow-up provider specified.    MEDICATIONS ON DISCHARGE   Daysi Farmer   Home Medication Instructions NORI:[id number]    Printed on:11/21/17 8086   Medication Information                      amlodipine (NORVASC) 5 MG TABS  Take 5 mg by mouth every morning.             apixaban (ELIQUIS) 2.5mg Tab  Take 2.5 mg by mouth 2 Times a Day.             Calcium Carb-Cholecalciferol (OYSTER SHELL CALCIUM/VITAMIN D) 250-125 MG-UNIT Tab tablet  Take 1 Tab by mouth 2 Times a Day.             famotidine (PEPCID) 20 MG Tab  Take 20 mg by mouth every day.             lisinopril (PRINIVIL, ZESTRIL) 30 MG tablet  Take 30 mg by mouth every morning.             Multiple Vitamins-Minerals (OCUVITE) TABS  Take 1 Tab by mouth every morning.             polyethylene glycol/lytes (MIRALAX) Pack  Take 17 g by mouth every day.             senna-docusate (PERICOLACE OR SENOKOT S) 8.6-50 MG Tab  Take 1 Tab by mouth every day.             triamcinolone acetonide (KENALOG) 0.1 % Cream  Apply 1 Application to affected area(s) every day.             vitamin D  (CHOLECALCIFEROL) 1000 UNIT Tab  Take 2,000 Units by mouth every morning.                 DIET  Orders Placed This Encounter   Procedures   • Diet Order     Standing Status:   Standing     Number of Occurrences:   1     Order Specific Question:   Diet:     Answer:   Consistent Carbohydrate [4]     Order Specific Question:   Texture/Fiber modifications:     Answer:   Dysphagia 3(Mechanical Soft)specify fluid consistency(question 6) [3]     Order Specific Question:   Consistency/Fluid modifications:     Answer:   Thin Liquids [3]       ACTIVITY  As tolerated.  Weight bearing as tolerated      CONSULTATIONS  None    PROCEDURES  None    LABORATORY  Lab Results   Component Value Date/Time    SODIUM 137 11/20/2017 09:38 AM    POTASSIUM 3.7 11/20/2017 09:38 AM    CHLORIDE 110 11/20/2017 09:38 AM    CO2 20 11/20/2017 09:38 AM    GLUCOSE 90 11/20/2017 09:38 AM    BUN 10 11/20/2017 09:38 AM    CREATININE 0.61 11/20/2017 09:38 AM    CREATININE 0.9 11/29/2008 04:45 AM        Lab Results   Component Value Date/Time    WBC 5.7 11/19/2017 08:40 AM    HEMOGLOBIN 12.7 11/19/2017 08:40 AM    HEMATOCRIT 39.3 11/19/2017 08:40 AM    PLATELETCT 354 11/19/2017 08:40 AM        Total time of the discharge process exceeds 36 minutes

## 2017-11-21 NOTE — DISCHARGE PLANNING
Medical Social Work    SW put packet together for pt to bring to the [facility] at 1630.  SW gave packet to bedside nurse and charge notified.

## 2017-11-21 NOTE — DISCHARGE PLANNING
Medical Social Work    PRESTON spoke with Anila at the [facility] - she needs to talk to her supervisor before pt can return.  SW expecting a call back to set up transfer.      SW received call back from the [facility]  - they have accepted pt back but they require copies of all tests.  SW will include labs with the DC summary.

## 2017-11-21 NOTE — DISCHARGE INSTRUCTIONS
Discharge Instructions    Discharged to home by medical transportation with escort. Discharged via wheelchair, hospital escort: Yes.  Special equipment needed: Not Applicable    Be sure to schedule a follow-up appointment with your primary care doctor or any specialists as instructed.     Discharge Plan:   Diet Plan: Discussed  Activity Level: Discussed  Confirmed Follow up Appointment: No (Comments)  Confirmed Symptoms Management: Discussed  Medication Reconciliation Updated: Yes    I understand that a diet low in cholesterol, fat, and sodium is recommended for good health. Unless I have been given specific instructions below for another diet, I accept this instruction as my diet prescription.   Other diet: carbohydrate consistent, mechanical soft (dysphagia 3), thin liquids    Special Instructions: None    · Is patient discharged on Warfarin / Coumadin?   No     · Is patient Post Blood Transfusion?  No    Depression / Suicide Risk    As you are discharged from this [facility] facility, it is important to learn how to keep safe from harming yourself.    Recognize the warning signs:  · Abrupt changes in personality, positive or negative- including increase in energy   · Giving away possessions  · Change in eating patterns- significant weight changes-  positive or negative  · Change in sleeping patterns- unable to sleep or sleeping all the time   · Unwillingness or inability to communicate  · Depression  · Unusual sadness, discouragement and loneliness  · Talk of wanting to die  · Neglect of personal appearance   · Rebelliousness- reckless behavior  · Withdrawal from people/activities they love  · Confusion- inability to concentrate     If you or a loved one observes any of these behaviors or has concerns about self-harm, here's what you can do:  · Talk about it- your feelings and reasons for harming yourself  · Remove any means that you might use to hurt yourself (examples: pills, rope, extension cords,  firearm)  · Get professional help from the community (Mental Health, Substance Abuse, psychological counseling)  · Do not be alone:Call your Safe Contact- someone whom you trust who will be there for you.  · Call your local CRISIS HOTLINE 444-1515 or 563-578-1329  · Call your local Children's Mobile Crisis Response Team [place] (341) 286-7458 or www.Blekko  · Call the toll free National Suicide Prevention Hotlines   · National Suicide Prevention Lifeline 468-257-IPLT (0581)  · National Hope Line Network 800-SUICIDE (473-8860)

## 2017-11-21 NOTE — PROGRESS NOTES
Received report from night shift RN. Assumed care of patient. Pt assessed and stable. VSS. Patient reports 0/10 pain at this time. No BM noted overnight. Pt disoriented to event and location.  Discussed plan of care for day with patient and received verbal understanding. Call light within reach, strip alarm active, bed in low position.  OK per MD for no IV access.

## 2017-11-21 NOTE — CARE PLAN
Problem: Knowledge Deficit  Goal: Knowledge of disease process/condition, treatment plan, diagnostic tests, and medications will improve  Pt and pt's family updated on pt's plan of care.    Problem: Skin Integrity  Goal: Risk for impaired skin integrity will decrease  Q2H turns, waffle mattress in place, pt routinely monitored for episodes of incontinence, pt promptly cleaned up after episodes of incontinence, barrier wipes used, extra pillows used for support.

## 2017-11-22 ENCOUNTER — PATIENT OUTREACH (OUTPATIENT)
Dept: HEALTH INFORMATION MANAGEMENT | Facility: OTHER | Age: 82
End: 2017-11-22

## 2017-11-22 NOTE — PROGRESS NOTES
Chart reviewed.  Pt was discharged from [facility] to [facility] the [facility] 11/21/17 and does not qualify for discharge outreach phone call.

## 2017-11-27 ENCOUNTER — APPOINTMENT (RX ONLY)
Dept: URBAN - METROPOLITAN AREA CLINIC 4 | Facility: CLINIC | Age: 82
Setting detail: DERMATOLOGY
End: 2017-11-27

## 2017-11-27 DIAGNOSIS — L21.8 OTHER SEBORRHEIC DERMATITIS: ICD-10-CM

## 2017-11-27 PROBLEM — Z85.828 PERSONAL HISTORY OF OTHER MALIGNANT NEOPLASM OF SKIN: Status: ACTIVE | Noted: 2017-11-27

## 2017-11-27 PROCEDURE — ? DIAGNOSIS COMMENT

## 2017-11-27 PROCEDURE — ? PRESCRIPTION

## 2017-11-27 PROCEDURE — 99212 OFFICE O/P EST SF 10 MIN: CPT

## 2017-11-27 PROCEDURE — ? COUNSELING

## 2017-11-27 RX ORDER — CLOBETASOL PROPIONATE 0.5 MG/ML
SOLUTION TOPICAL
Qty: 1 | Refills: 2 | Status: ERX | COMMUNITY
Start: 2017-11-27

## 2017-11-27 RX ORDER — KETOCONAZOLE 20.5 MG/ML
SHAMPOO, SUSPENSION TOPICAL BIW
Qty: 1 | Refills: 3 | Status: ERX | COMMUNITY
Start: 2017-11-27

## 2017-11-27 RX ADMIN — CLOBETASOL PROPIONATE: 0.5 SOLUTION TOPICAL at 19:19

## 2017-11-27 RX ADMIN — KETOCONAZOLE: 20.5 SHAMPOO, SUSPENSION TOPICAL at 19:19

## 2017-11-27 ASSESSMENT — LOCATION SIMPLE DESCRIPTION DERM: LOCATION SIMPLE: POSTERIOR SCALP

## 2017-11-27 ASSESSMENT — LOCATION DETAILED DESCRIPTION DERM: LOCATION DETAILED: MID-OCCIPITAL SCALP

## 2017-11-27 ASSESSMENT — LOCATION ZONE DERM: LOCATION ZONE: SCALP

## 2017-11-27 NOTE — HPI: EVALUATION OF SKIN LESION(S)
How Severe Are Your Spot(S)?: moderate
Have Your Spot(S) Been Treated In The Past?: has been treated
Hpi Title: Evaluation of Skin Lesions
Additional History: Patient has been treated by her PCP for cradle cap and no treatments have helped. They have tried Nizoral AD 1%, methyl pred 4 mg dose kateryna, and TAC cream 0.1%. Patient consistently itches the affected area and is now in for further evaluation and management.

## 2017-12-12 ENCOUNTER — HOSPITAL ENCOUNTER (EMERGENCY)
Facility: MEDICAL CENTER | Age: 82
End: 2017-12-12
Attending: EMERGENCY MEDICINE
Payer: MEDICARE

## 2017-12-12 VITALS
OXYGEN SATURATION: 94 % | HEIGHT: 67 IN | BODY MASS INDEX: 21.97 KG/M2 | WEIGHT: 140 LBS | HEART RATE: 77 BPM | RESPIRATION RATE: 18 BRPM | DIASTOLIC BLOOD PRESSURE: 52 MMHG | SYSTOLIC BLOOD PRESSURE: 147 MMHG | TEMPERATURE: 98.1 F

## 2017-12-12 DIAGNOSIS — R31.9 URINARY TRACT INFECTION WITH HEMATURIA, SITE UNSPECIFIED: ICD-10-CM

## 2017-12-12 DIAGNOSIS — N39.0 URINARY TRACT INFECTION WITH HEMATURIA, SITE UNSPECIFIED: ICD-10-CM

## 2017-12-12 LAB
ALBUMIN SERPL BCP-MCNC: 3.7 G/DL (ref 3.2–4.9)
ALBUMIN/GLOB SERPL: 1.3 G/DL
ALP SERPL-CCNC: 76 U/L (ref 30–99)
ALT SERPL-CCNC: 11 U/L (ref 2–50)
ANION GAP SERPL CALC-SCNC: 7 MMOL/L (ref 0–11.9)
APPEARANCE UR: ABNORMAL
AST SERPL-CCNC: 16 U/L (ref 12–45)
BACTERIA #/AREA URNS HPF: NEGATIVE /HPF
BASOPHILS # BLD AUTO: 0.8 % (ref 0–1.8)
BASOPHILS # BLD: 0.04 K/UL (ref 0–0.12)
BILIRUB SERPL-MCNC: 0.5 MG/DL (ref 0.1–1.5)
BILIRUB UR QL STRIP.AUTO: NEGATIVE
BUN SERPL-MCNC: 13 MG/DL (ref 8–22)
CALCIUM SERPL-MCNC: 9.1 MG/DL (ref 8.5–10.5)
CHLORIDE SERPL-SCNC: 107 MMOL/L (ref 96–112)
CO2 SERPL-SCNC: 21 MMOL/L (ref 20–33)
COLOR UR: YELLOW
CREAT SERPL-MCNC: 0.62 MG/DL (ref 0.5–1.4)
CULTURE IF INDICATED INDCX: YES UA CULTURE
EKG IMPRESSION: NORMAL
EOSINOPHIL # BLD AUTO: 0.31 K/UL (ref 0–0.51)
EOSINOPHIL NFR BLD: 6 % (ref 0–6.9)
EPI CELLS #/AREA URNS HPF: ABNORMAL /HPF
ERYTHROCYTE [DISTWIDTH] IN BLOOD BY AUTOMATED COUNT: 45.8 FL (ref 35.9–50)
GFR SERPL CREATININE-BSD FRML MDRD: >60 ML/MIN/1.73 M 2
GLOBULIN SER CALC-MCNC: 2.8 G/DL (ref 1.9–3.5)
GLUCOSE SERPL-MCNC: 103 MG/DL (ref 65–99)
GLUCOSE UR STRIP.AUTO-MCNC: NEGATIVE MG/DL
HCT VFR BLD AUTO: 38 % (ref 37–47)
HGB BLD-MCNC: 12.5 G/DL (ref 12–16)
HYALINE CASTS #/AREA URNS LPF: ABNORMAL /LPF
IMM GRANULOCYTES # BLD AUTO: 0.01 K/UL (ref 0–0.11)
IMM GRANULOCYTES NFR BLD AUTO: 0.2 % (ref 0–0.9)
KETONES UR STRIP.AUTO-MCNC: ABNORMAL MG/DL
LEUKOCYTE ESTERASE UR QL STRIP.AUTO: ABNORMAL
LIPASE SERPL-CCNC: 13 U/L (ref 11–82)
LYMPHOCYTES # BLD AUTO: 0.97 K/UL (ref 1–4.8)
LYMPHOCYTES NFR BLD: 18.8 % (ref 22–41)
MCH RBC QN AUTO: 28.4 PG (ref 27–33)
MCHC RBC AUTO-ENTMCNC: 32.9 G/DL (ref 33.6–35)
MCV RBC AUTO: 86.4 FL (ref 81.4–97.8)
MICRO URNS: ABNORMAL
MONOCYTES # BLD AUTO: 0.67 K/UL (ref 0–0.85)
MONOCYTES NFR BLD AUTO: 13 % (ref 0–13.4)
NEUTROPHILS # BLD AUTO: 3.16 K/UL (ref 2–7.15)
NEUTROPHILS NFR BLD: 61.2 % (ref 44–72)
NITRITE UR QL STRIP.AUTO: NEGATIVE
NRBC # BLD AUTO: 0 K/UL
NRBC BLD AUTO-RTO: 0 /100 WBC
PH UR STRIP.AUTO: 6 [PH]
PLATELET # BLD AUTO: 318 K/UL (ref 164–446)
PMV BLD AUTO: 9.1 FL (ref 9–12.9)
POTASSIUM SERPL-SCNC: 3.9 MMOL/L (ref 3.6–5.5)
PROT SERPL-MCNC: 6.5 G/DL (ref 6–8.2)
PROT UR QL STRIP: 100 MG/DL
RBC # BLD AUTO: 4.4 M/UL (ref 4.2–5.4)
RBC # URNS HPF: >150 /HPF
RBC UR QL AUTO: ABNORMAL
SODIUM SERPL-SCNC: 135 MMOL/L (ref 135–145)
SP GR UR STRIP.AUTO: 1.02
TRANS CELLS #/AREA URNS HPF: ABNORMAL /HPF
TROPONIN I SERPL-MCNC: <0.01 NG/ML (ref 0–0.04)
UROBILINOGEN UR STRIP.AUTO-MCNC: 0.2 MG/DL
WBC # BLD AUTO: 5.2 K/UL (ref 4.8–10.8)
WBC #/AREA URNS HPF: ABNORMAL /HPF

## 2017-12-12 PROCEDURE — 85025 COMPLETE CBC W/AUTO DIFF WBC: CPT

## 2017-12-12 PROCEDURE — 84484 ASSAY OF TROPONIN QUANT: CPT

## 2017-12-12 PROCEDURE — 80053 COMPREHEN METABOLIC PANEL: CPT

## 2017-12-12 PROCEDURE — 99285 EMERGENCY DEPT VISIT HI MDM: CPT

## 2017-12-12 PROCEDURE — 87086 URINE CULTURE/COLONY COUNT: CPT

## 2017-12-12 PROCEDURE — A9270 NON-COVERED ITEM OR SERVICE: HCPCS | Performed by: EMERGENCY MEDICINE

## 2017-12-12 PROCEDURE — 36415 COLL VENOUS BLD VENIPUNCTURE: CPT

## 2017-12-12 PROCEDURE — 700102 HCHG RX REV CODE 250 W/ 637 OVERRIDE(OP): Performed by: EMERGENCY MEDICINE

## 2017-12-12 PROCEDURE — 93005 ELECTROCARDIOGRAM TRACING: CPT | Performed by: EMERGENCY MEDICINE

## 2017-12-12 PROCEDURE — 83690 ASSAY OF LIPASE: CPT

## 2017-12-12 PROCEDURE — 81001 URINALYSIS AUTO W/SCOPE: CPT

## 2017-12-12 RX ORDER — CEFDINIR 300 MG/1
300 CAPSULE ORAL ONCE
Status: COMPLETED | OUTPATIENT
Start: 2017-12-12 | End: 2017-12-12

## 2017-12-12 RX ORDER — CEFDINIR 300 MG/1
CAPSULE ORAL
Status: DISCONTINUED
Start: 2017-12-12 | End: 2017-12-13 | Stop reason: HOSPADM

## 2017-12-12 RX ORDER — CEFDINIR 300 MG/1
300 CAPSULE ORAL 2 TIMES DAILY
Qty: 20 CAP | Refills: 0 | Status: SHIPPED | OUTPATIENT
Start: 2017-12-12 | End: 2017-12-22

## 2017-12-12 RX ADMIN — CEFDINIR 300 MG: 300 CAPSULE ORAL at 22:46

## 2017-12-12 ASSESSMENT — ENCOUNTER SYMPTOMS
FEVER: 0
ABDOMINAL PAIN: 1
PALPITATIONS: 1
SHORTNESS OF BREATH: 1
CHILLS: 0

## 2017-12-13 ENCOUNTER — PATIENT OUTREACH (OUTPATIENT)
Dept: HEALTH INFORMATION MANAGEMENT | Facility: OTHER | Age: 82
End: 2017-12-13

## 2017-12-13 NOTE — DISCHARGE INSTRUCTIONS
Urinary Tract Infection  Urinary tract infections (UTIs) can develop anywhere along your urinary tract. Your urinary tract is your body's drainage system for removing wastes and extra water. Your urinary tract includes two kidneys, two ureters, a bladder, and a urethra. Your kidneys are a pair of bean-shaped organs. Each kidney is about the size of your fist. They are located below your ribs, one on each side of your spine.  CAUSES  Infections are caused by microbes, which are microscopic organisms, including fungi, viruses, and bacteria. These organisms are so small that they can only be seen through a microscope. Bacteria are the microbes that most commonly cause UTIs.  SYMPTOMS   Symptoms of UTIs may vary by age and gender of the patient and by the location of the infection. Symptoms in young women typically include a frequent and intense urge to urinate and a painful, burning feeling in the bladder or urethra during urination. Older women and men are more likely to be tired, shaky, and weak and have muscle aches and abdominal pain. A fever may mean the infection is in your kidneys. Other symptoms of a kidney infection include pain in your back or sides below the ribs, nausea, and vomiting.  DIAGNOSIS  To diagnose a UTI, your caregiver will ask you about your symptoms. Your caregiver also will ask to provide a urine sample. The urine sample will be tested for bacteria and white blood cells. White blood cells are made by your body to help fight infection.  TREATMENT   Typically, UTIs can be treated with medication. Because most UTIs are caused by a bacterial infection, they usually can be treated with the use of antibiotics. The choice of antibiotic and length of treatment depend on your symptoms and the type of bacteria causing your infection.  HOME CARE INSTRUCTIONS  · If you were prescribed antibiotics, take them exactly as your caregiver instructs you. Finish the medication even if you feel better after you  have only taken some of the medication.  · Drink enough water and fluids to keep your urine clear or pale yellow.  · Avoid caffeine, tea, and carbonated beverages. They tend to irritate your bladder.  · Empty your bladder often. Avoid holding urine for long periods of time.  · Empty your bladder before and after sexual intercourse.  · After a bowel movement, women should cleanse from front to back. Use each tissue only once.  SEEK MEDICAL CARE IF:   · You have back pain.  · You develop a fever.  · Your symptoms do not begin to resolve within 3 days.  SEEK IMMEDIATE MEDICAL CARE IF:   · You have severe back pain or lower abdominal pain.  · You develop chills.  · You have nausea or vomiting.  · You have continued burning or discomfort with urination.  MAKE SURE YOU:   · Understand these instructions.  · Will watch your condition.  · Will get help right away if you are not doing well or get worse.     This information is not intended to replace advice given to you by your health care provider. Make sure you discuss any questions you have with your health care provider.     Document Released: 09/27/2006 Document Revised: 01/08/2016 Document Reviewed: 01/25/2013  [employer] Interactive Patient Education ©2016 [employer]

## 2017-12-13 NOTE — ED PROVIDER NOTES
ED Provider Note    Scribed for Kristina Rosario M.D. by Jos Portillo. 12/12/2017, 7:16 PM.    Primary care provider: Denver J Miller, M.D.  Means of arrival: EMS  History obtained from: Daughter  History limited by: Dementia    CHIEF COMPLAINT  Chief Complaint   Patient presents with   • Abdominal Cramps     Pt reports cramping, staff at [facility] reports dark, foul smelling urine       HPI  Daysi Farmer is a 89 y.o. female with a history of dementia who presents to the Emergency Department for evaluation after being found to have palpitations and abdominal cramping earlier today at her care facility. Daughter states patient was in [place] when she received the call reporting patient with these symptoms. Patient reports having abdominal discomfort Currently but declines any other symptoms. Daughter states patient has had similar episodes in the past, however, she has not had an episode for a long time. The episodes are usually related to anxiety or UTIs. Daughter or patient do not report any recent fevers or chills. She notes patient was here 1 month ago in which she was treated for a UTI.     History obtained from daughter. History is limited from patient due to history of dementia.    REVIEW OF SYSTEMS  Review of Systems   Constitutional: Negative for chills and fever.   Respiratory: Positive for shortness of breath.    Cardiovascular: Positive for chest pain and palpitations (fast heart rate).   Gastrointestinal: Positive for abdominal pain.   Skin:        Positive flushed skin     ROS obtained from daughter. ROS is limited from patient due to history of dementia.  C    PAST MEDICAL HISTORY   has a past medical history of Acute renal failure (ARF) (CMS-HCC) (3/1/2015); Cancer (CMS-HCC); Dementia, old age; Diabetes mellitus type 2 in obese (CMS-HCC) (8/15/2013); Hearing disorder; Hypertension; Macular degeneration; MEDICAL HOME; and UTI (urinary tract infection) (2/19/2015).    SURGICAL HISTORY   has a  "past surgical history that includes hysterectomy, total abdominal; other abdominal surgery; other; and hip hemiarthroplasty (2/21/2015).    SOCIAL HISTORY  Social History   Substance Use Topics   • Smoking status: Never Smoker   • Smokeless tobacco: Never Used   • Alcohol use No      History   Drug Use No       FAMILY HISTORY  Family History   Problem Relation Age of Onset   • Cancer Father    • Psychiatry Brother        CURRENT MEDICATIONS  No current facility-administered medications on file prior to encounter.      Current Outpatient Prescriptions on File Prior to Encounter   Medication Sig Dispense Refill   • polyethylene glycol/lytes (MIRALAX) Pack Take 17 g by mouth every day.     • senna-docusate (PERICOLACE OR SENOKOT S) 8.6-50 MG Tab Take 1 Tab by mouth every day.     • triamcinolone acetonide (KENALOG) 0.1 % Cream Apply 1 Application to affected area(s) every day.     • apixaban (ELIQUIS) 2.5mg Tab Take 2.5 mg by mouth 2 Times a Day.     • famotidine (PEPCID) 20 MG Tab Take 20 mg by mouth every day.     • Calcium Carb-Cholecalciferol (OYSTER SHELL CALCIUM/VITAMIN D) 250-125 MG-UNIT Tab tablet Take 1 Tab by mouth 2 Times a Day.     • vitamin D (CHOLECALCIFEROL) 1000 UNIT Tab Take 2,000 Units by mouth every morning.     • lisinopril (PRINIVIL, ZESTRIL) 30 MG tablet Take 30 mg by mouth every morning.     • amlodipine (NORVASC) 5 MG TABS Take 5 mg by mouth every morning.     • Multiple Vitamins-Minerals (OCUVITE) TABS Take 1 Tab by mouth every morning.        ALLERGIES  Allergies   Allergen Reactions   • Sulfa Drugs        PHYSICAL EXAM  VITAL SIGNS: /52   Pulse 65   Temp 36.6 °C (97.9 °F)   Resp 16   Ht 1.702 m (5' 7\")   Wt 63.5 kg (140 lb)   LMP 12/24/1960   BMI 21.93 kg/m²   Vitals reviewed by myself.  Physical Exam  Nursing note and vitals reviewed.  Constitutional: Well-developed and well-nourished. No acute distress.   HENT: Head is normocephalic and atraumatic.  Eyes: extra-ocular movements " intact  Cardiovascular: Regular rate and regular rhythm. No murmur heard.  Pulmonary/Chest: Breath sounds normal. No wheezes or rales.   Abdominal: Soft and non-tender. No distention.    Musculoskeletal: Extremities exhibit normal range of motion without edema or tenderness.   Neurological: Awake and alert to self only, per daughter this is baseline  Skin: Skin is warm and dry. No rash.     DIAGNOSTIC STUDIES /  LABS  Labs Reviewed   CBC WITH DIFFERENTIAL - Abnormal; Notable for the following:        Result Value    MCHC 32.9 (*)     Lymphocytes 18.80 (*)     Lymphs (Absolute) 0.97 (*)     All other components within normal limits   COMP METABOLIC PANEL - Abnormal; Notable for the following:     Glucose 103 (*)     All other components within normal limits   URINALYSIS,CULTURE IF INDICATED - Abnormal; Notable for the following:     Character Turbid (*)     Ketones Trace (*)     Protein 100 (*)     Leukocyte Esterase Moderate (*)     Occult Blood Large (*)     All other components within normal limits   URINE MICROSCOPIC (W/UA) - Abnormal; Notable for the following:     WBC  (*)     RBC >150 (*)     Epithelial Cells Many (*)     All other components within normal limits   LIPASE   TROPONIN   ESTIMATED GFR   URINE CULTURE(NEW)   URINE CULTURE(NEW)      All labs reviewed by me.    EKG Interpretation:  Interpreted by myself  12 Lead EKG interpreted by me to show:  Time: 1925  Normal sinus rhythm  Rate 61  Axis: Normal  Intervals: Normal  Normal T waves  Normal ST segments  My impression of this EKG: Does not indicate ischemia or arrhythmia at this time.    REASSESSMENT  7:16 PM Patient seen and examined at bedside. Discussed plan of care which includes labs and EKG. Patient and daughter understand and agree.    10:28 PM Patient reevaluated at bedside. Discussed lab results as seen above which shows UTI. The patient will be discharged with instructions regarding supportive care and medications including omnicef.  Discussed indications for seeking immediate medical attention. Patient was given the opportunity for questions. The patient understands and agrees.      COURSE & MEDICAL DECISION MAKING  Nursing notes, VS, PMSFHx reviewed in chart.    Patient is a 89-year-old female who comes in for palpitations which are now resolved and abdominal discomfort. Differential diagnosis includes UTI, arrhythmia, electrolyte abnormality. Diagnostic workup included EKG and labs.    Patient's initial vitals are within normal limits. She declines pain medication for her abdominal discomfort. She is otherwise well appearing on exam. EKG returned that demonstrates no evidence of arrhythmia or ischemia. Labs returned and are unremarkable. Urinalysis returned and is notable for infection. I reviewed patient's prior culture from November 2017 and it was noted to be negative. Therefore elect to treat patient with cefdinir and we'll provide her with prescription for cefdinir. Daughter is advised to have patient follow up with primary care physician, and daughter will transfer the patient back to her care facility. Patient is then discharged home in stable condition.       The patient will return for new or worsening symptoms and is stable at the time of discharge.    The patient is referred to a primary physician for blood pressure management, diabetic screening, and for all other preventative health concerns.    DISPOSITION:  Patient will be discharged home in stable condition.    FOLLOW UP:  Denver J Miller, M.D.  [id number]   [address] [address]  [phone]            OUTPATIENT MEDICATIONS:  New Prescriptions    CEFDINIR (OMNICEF) 300 MG CAP    Take 1 Cap by mouth 2 times a day for 10 days.        FINAL IMPRESSION  1. Urinary tract infection with hematuria, site unspecified          IJos (Scrrodney), claritza scribing for, and in the presence of, Kristina Rosario M.D..    Electronically signed by: Jos Portillo (Víctor),  12/12/2017    IKristina M.D. personally performed the services described in this documentation, as scribed by Jos Portillo in my presence, and it is both accurate and complete.    The note accurately reflects work and decisions made by me.  Kristina Rosario  12/13/2017  3:22 AM

## 2017-12-13 NOTE — ED NOTES
Pt BIB EMS  Chief Complaint   Patient presents with   • Abdominal Cramps     Pt reports cramping, staff at [facility] reports dark, foul smelling urine   Pt has dementia, is at baseline.  Chart up for ERP

## 2017-12-13 NOTE — ED NOTES
Pt assisted onto bedside commode, successful urination. Urine collected and sent. Pt returned to [facility], provided warm blankets, resting in [facility] comfortably.

## 2017-12-13 NOTE — ED NOTES
Reviewed discharge instructions with pt daughter, verbalized understanding of instructions and antibiotic. States she will schedule follow-up appointment. Denies further questions at this time. Pt taken out of ER via w/c and assisted into car in stable condition.

## 2017-12-14 LAB
BACTERIA UR CULT: NORMAL
SIGNIFICANT IND 70042: NORMAL
SITE SITE: NORMAL
SOURCE SOURCE: NORMAL

## 2018-09-03 ENCOUNTER — HOSPITAL ENCOUNTER (OUTPATIENT)
Facility: MEDICAL CENTER | Age: 83
End: 2018-09-04
Attending: EMERGENCY MEDICINE | Admitting: HOSPITALIST
Payer: MEDICARE

## 2018-09-03 DIAGNOSIS — G30.9 ALZHEIMER'S DEMENTIA WITHOUT BEHAVIORAL DISTURBANCE, UNSPECIFIED TIMING OF DEMENTIA ONSET: ICD-10-CM

## 2018-09-03 DIAGNOSIS — N30.00 ACUTE CYSTITIS WITHOUT HEMATURIA: ICD-10-CM

## 2018-09-03 DIAGNOSIS — F02.80 ALZHEIMER'S DEMENTIA WITHOUT BEHAVIORAL DISTURBANCE, UNSPECIFIED TIMING OF DEMENTIA ONSET: ICD-10-CM

## 2018-09-03 LAB
APPEARANCE UR: CLEAR
BACTERIA #/AREA URNS HPF: NEGATIVE /HPF
BILIRUB UR QL STRIP.AUTO: NEGATIVE
COLOR UR: YELLOW
EPI CELLS #/AREA URNS HPF: NEGATIVE /HPF
GLUCOSE UR STRIP.AUTO-MCNC: 100 MG/DL
HYALINE CASTS #/AREA URNS LPF: ABNORMAL /LPF
KETONES UR STRIP.AUTO-MCNC: NEGATIVE MG/DL
LEUKOCYTE ESTERASE UR QL STRIP.AUTO: ABNORMAL
MICRO URNS: ABNORMAL
NITRITE UR QL STRIP.AUTO: NEGATIVE
PH UR STRIP.AUTO: 6 [PH]
PROT UR QL STRIP: 30 MG/DL
RBC # URNS HPF: ABNORMAL /HPF
RBC UR QL AUTO: ABNORMAL
SP GR UR STRIP.AUTO: 1.02
UROBILINOGEN UR STRIP.AUTO-MCNC: 0.2 MG/DL
WBC #/AREA URNS HPF: ABNORMAL /HPF

## 2018-09-03 PROCEDURE — 700102 HCHG RX REV CODE 250 W/ 637 OVERRIDE(OP): Performed by: EMERGENCY MEDICINE

## 2018-09-03 PROCEDURE — 700102 HCHG RX REV CODE 250 W/ 637 OVERRIDE(OP): Performed by: HOSPITALIST

## 2018-09-03 PROCEDURE — A9270 NON-COVERED ITEM OR SERVICE: HCPCS | Performed by: HOSPITALIST

## 2018-09-03 PROCEDURE — A9270 NON-COVERED ITEM OR SERVICE: HCPCS | Performed by: EMERGENCY MEDICINE

## 2018-09-03 PROCEDURE — 99285 EMERGENCY DEPT VISIT HI MDM: CPT

## 2018-09-03 PROCEDURE — 700111 HCHG RX REV CODE 636 W/ 250 OVERRIDE (IP): Performed by: EMERGENCY MEDICINE

## 2018-09-03 PROCEDURE — 87086 URINE CULTURE/COLONY COUNT: CPT

## 2018-09-03 PROCEDURE — G0378 HOSPITAL OBSERVATION PER HR: HCPCS

## 2018-09-03 PROCEDURE — 99220 PR INITIAL OBSERVATION CARE,LEVL III: CPT | Mod: GW | Performed by: HOSPITALIST

## 2018-09-03 PROCEDURE — 81001 URINALYSIS AUTO W/SCOPE: CPT

## 2018-09-03 PROCEDURE — 304561 HCHG STAT O2

## 2018-09-03 RX ORDER — LISINOPRIL 20 MG/1
30 TABLET ORAL EVERY MORNING
Status: DISCONTINUED | OUTPATIENT
Start: 2018-09-04 | End: 2018-09-04 | Stop reason: HOSPADM

## 2018-09-03 RX ORDER — KETOCONAZOLE 20 MG/ML
SHAMPOO TOPICAL
COMMUNITY

## 2018-09-03 RX ORDER — AMLODIPINE BESYLATE 5 MG/1
5 TABLET ORAL EVERY MORNING
Status: DISCONTINUED | OUTPATIENT
Start: 2018-09-04 | End: 2018-09-04 | Stop reason: HOSPADM

## 2018-09-03 RX ORDER — ACETAMINOPHEN 325 MG/1
650 TABLET ORAL ONCE
Status: COMPLETED | OUTPATIENT
Start: 2018-09-03 | End: 2018-09-03

## 2018-09-03 RX ORDER — CEFDINIR 300 MG/1
300 CAPSULE ORAL EVERY 12 HOURS
Status: DISCONTINUED | OUTPATIENT
Start: 2018-09-03 | End: 2018-09-04

## 2018-09-03 RX ORDER — ONDANSETRON 4 MG/1
4 TABLET, ORALLY DISINTEGRATING ORAL ONCE
Status: COMPLETED | OUTPATIENT
Start: 2018-09-03 | End: 2018-09-03

## 2018-09-03 RX ORDER — MORPHINE SULFATE 100 MG/5ML
10 SOLUTION ORAL
Status: DISCONTINUED | OUTPATIENT
Start: 2018-09-03 | End: 2018-09-04 | Stop reason: HOSPADM

## 2018-09-03 RX ORDER — LORAZEPAM 2 MG/ML
1 INJECTION INTRAMUSCULAR
Status: DISCONTINUED | OUTPATIENT
Start: 2018-09-03 | End: 2018-09-03

## 2018-09-03 RX ORDER — ALPRAZOLAM 0.25 MG/1
0.25 TABLET ORAL EVERY 4 HOURS PRN
COMMUNITY

## 2018-09-03 RX ORDER — LORAZEPAM 2 MG/ML
1 CONCENTRATE ORAL
Status: DISCONTINUED | OUTPATIENT
Start: 2018-09-03 | End: 2018-09-04 | Stop reason: HOSPADM

## 2018-09-03 RX ORDER — DOCUSATE SODIUM 100 MG/1
100 CAPSULE, LIQUID FILLED ORAL EVERY 12 HOURS
Status: DISCONTINUED | OUTPATIENT
Start: 2018-09-03 | End: 2018-09-04 | Stop reason: HOSPADM

## 2018-09-03 RX ORDER — DOCUSATE SODIUM 100 MG/1
100 CAPSULE, LIQUID FILLED ORAL
COMMUNITY

## 2018-09-03 RX ORDER — OMEPRAZOLE 20 MG/1
20 CAPSULE, DELAYED RELEASE ORAL DAILY
COMMUNITY

## 2018-09-03 RX ORDER — ONDANSETRON 4 MG/1
8 TABLET, ORALLY DISINTEGRATING ORAL EVERY 8 HOURS PRN
Status: DISCONTINUED | OUTPATIENT
Start: 2018-09-03 | End: 2018-09-04 | Stop reason: HOSPADM

## 2018-09-03 RX ORDER — OXYCODONE HCL 5 MG/5 ML
5 SOLUTION, ORAL ORAL EVERY 4 HOURS PRN
Status: DISCONTINUED | OUTPATIENT
Start: 2018-09-03 | End: 2018-09-04 | Stop reason: HOSPADM

## 2018-09-03 RX ORDER — ACETAMINOPHEN 325 MG/1
650 TABLET ORAL EVERY 4 HOURS PRN
COMMUNITY
End: 2019-01-01 | Stop reason: CLARIF

## 2018-09-03 RX ORDER — NITROFURANTOIN 25; 75 MG/1; MG/1
100 CAPSULE ORAL 2 TIMES DAILY
Status: ON HOLD | COMMUNITY
End: 2018-09-04

## 2018-09-03 RX ORDER — ONDANSETRON 2 MG/ML
8 INJECTION INTRAMUSCULAR; INTRAVENOUS EVERY 8 HOURS PRN
Status: DISCONTINUED | OUTPATIENT
Start: 2018-09-03 | End: 2018-09-03

## 2018-09-03 RX ORDER — CIPROFLOXACIN 500 MG/1
500 TABLET, FILM COATED ORAL ONCE
Status: COMPLETED | OUTPATIENT
Start: 2018-09-03 | End: 2018-09-03

## 2018-09-03 RX ORDER — ATROPINE SULFATE 10 MG/ML
2 SOLUTION/ DROPS OPHTHALMIC EVERY 4 HOURS PRN
Status: DISCONTINUED | OUTPATIENT
Start: 2018-09-03 | End: 2018-09-04 | Stop reason: HOSPADM

## 2018-09-03 RX ORDER — FAMOTIDINE 20 MG/1
20 TABLET, FILM COATED ORAL DAILY
Status: DISCONTINUED | OUTPATIENT
Start: 2018-09-04 | End: 2018-09-04 | Stop reason: HOSPADM

## 2018-09-03 RX ORDER — POLYVINYL ALCOHOL 14 MG/ML
2 SOLUTION/ DROPS OPHTHALMIC EVERY 6 HOURS PRN
Status: DISCONTINUED | OUTPATIENT
Start: 2018-09-03 | End: 2018-09-04 | Stop reason: HOSPADM

## 2018-09-03 RX ADMIN — ACETAMINOPHEN 650 MG: 325 TABLET, FILM COATED ORAL at 15:44

## 2018-09-03 RX ADMIN — APIXABAN 2.5 MG: 2.5 TABLET, FILM COATED ORAL at 19:38

## 2018-09-03 RX ADMIN — ONDANSETRON 4 MG: 4 TABLET, ORALLY DISINTEGRATING ORAL at 15:44

## 2018-09-03 RX ADMIN — CEFDINIR 300 MG: 300 CAPSULE ORAL at 19:39

## 2018-09-03 RX ADMIN — CIPROFLOXACIN HYDROCHLORIDE 500 MG: 500 TABLET, FILM COATED ORAL at 15:45

## 2018-09-03 ASSESSMENT — CHA2DS2 SCORE
VASCULAR DISEASE: NO
AGE 65 TO 74: NO
PRIOR STROKE OR TIA OR THROMBOEMBOLISM: NO
SEX: FEMALE
CHF OR LEFT VENTRICULAR DYSFUNCTION: NO
AGE 75 OR GREATER: YES
CHA2DS2 VASC SCORE: 4
DIABETES: NO
HYPERTENSION: YES

## 2018-09-03 ASSESSMENT — ENCOUNTER SYMPTOMS
CHILLS: 0
HEMOPTYSIS: 0
DIARRHEA: 0
NERVOUS/ANXIOUS: 0
LOSS OF CONSCIOUSNESS: 0
ABDOMINAL PAIN: 0
VOMITING: 0
DOUBLE VISION: 0
BLOOD IN STOOL: 0
NAUSEA: 0
GASTROINTESTINAL NEGATIVE: 1
PSYCHIATRIC NEGATIVE: 1
MUSCULOSKELETAL NEGATIVE: 1
WEAKNESS: 1
HEARTBURN: 0
HEADACHES: 0
SEIZURES: 0
COUGH: 0
FOCAL WEAKNESS: 0
EYES NEGATIVE: 1
BRUISES/BLEEDS EASILY: 0
RESPIRATORY NEGATIVE: 1
DIAPHORESIS: 0
FEVER: 0
DIZZINESS: 0
WHEEZING: 0
CONSTIPATION: 0
PALPITATIONS: 0
CARDIOVASCULAR NEGATIVE: 1

## 2018-09-03 ASSESSMENT — PATIENT HEALTH QUESTIONNAIRE - PHQ9
2. FEELING DOWN, DEPRESSED, IRRITABLE, OR HOPELESS: NOT AT ALL
SUM OF ALL RESPONSES TO PHQ9 QUESTIONS 1 AND 2: 0
1. LITTLE INTEREST OR PLEASURE IN DOING THINGS: NOT AT ALL

## 2018-09-03 ASSESSMENT — PAIN SCALES - GENERAL
PAINLEVEL_OUTOF10: ASSUMED PAIN PRESENT
PAINLEVEL_OUTOF10: 0

## 2018-09-03 NOTE — ED TRIAGE NOTES
LAVELL Taylor from [facility] unit.  Pt c/o blood in urine x 1 week, dx UTI & started on Macrobid x 2 doses.  Pt increasingly weak over past week.  Pt developed N/V/D today; incontinent of stool & urine on arrival to ED.

## 2018-09-04 VITALS
DIASTOLIC BLOOD PRESSURE: 44 MMHG | HEART RATE: 62 BPM | RESPIRATION RATE: 16 BRPM | WEIGHT: 120 LBS | SYSTOLIC BLOOD PRESSURE: 102 MMHG | BODY MASS INDEX: 21.26 KG/M2 | TEMPERATURE: 98.8 F | OXYGEN SATURATION: 94 % | HEIGHT: 63 IN

## 2018-09-04 PROCEDURE — G0378 HOSPITAL OBSERVATION PER HR: HCPCS

## 2018-09-04 PROCEDURE — A6213 FOAM DRG >16<=48 SQ IN W/BDR: HCPCS | Performed by: HOSPITALIST

## 2018-09-04 PROCEDURE — 700102 HCHG RX REV CODE 250 W/ 637 OVERRIDE(OP): Performed by: HOSPITALIST

## 2018-09-04 PROCEDURE — A9270 NON-COVERED ITEM OR SERVICE: HCPCS | Performed by: HOSPITALIST

## 2018-09-04 PROCEDURE — A6212 FOAM DRG <=16 SQ IN W/BORDER: HCPCS | Performed by: HOSPITALIST

## 2018-09-04 PROCEDURE — 99217 PR OBSERVATION CARE DISCHARGE: CPT | Mod: GW | Performed by: INTERNAL MEDICINE

## 2018-09-04 PROCEDURE — 700112 HCHG RX REV CODE 229: Performed by: HOSPITALIST

## 2018-09-04 RX ORDER — CEFDINIR 300 MG/1
300 CAPSULE ORAL EVERY 12 HOURS
Status: DISCONTINUED | OUTPATIENT
Start: 2018-09-04 | End: 2018-09-04 | Stop reason: HOSPADM

## 2018-09-04 RX ORDER — CEFDINIR 300 MG/1
300 CAPSULE ORAL 2 TIMES DAILY
Qty: 12 CAP | Refills: 0 | Status: SHIPPED | OUTPATIENT
Start: 2018-09-05 | End: 2018-09-11

## 2018-09-04 RX ADMIN — APIXABAN 2.5 MG: 2.5 TABLET, FILM COATED ORAL at 06:17

## 2018-09-04 RX ADMIN — AMLODIPINE BESYLATE 5 MG: 5 TABLET ORAL at 06:19

## 2018-09-04 RX ADMIN — DOCUSATE SODIUM 100 MG: 100 CAPSULE ORAL at 06:21

## 2018-09-04 RX ADMIN — FAMOTIDINE 20 MG: 20 TABLET, FILM COATED ORAL at 06:19

## 2018-09-04 RX ADMIN — CEFDINIR 300 MG: 300 CAPSULE ORAL at 06:17

## 2018-09-04 RX ADMIN — LISINOPRIL 30 MG: 20 TABLET ORAL at 06:18

## 2018-09-04 ASSESSMENT — PAIN SCALES - GENERAL
PAINLEVEL_OUTOF10: 0
PAINLEVEL_OUTOF10: 0

## 2018-09-04 NOTE — ED PROVIDER NOTES
ED Provider Note    CHIEF COMPLAINT  Chief Complaint   Patient presents with   • Nausea/Vomiting/Diarrhea     started today   • Weakness     increasing over past week   • UTI     started Macrobid x 2 doses   • Blood in Urine     x 1 week       HPI  Daysi Farmer is a 89 y.o. female who presents to the emergency department brought in from her memory care unit because of a urinary tract infection.  The patient suffers from severe dementia and really can provide no history whatsoever.  According to the EMS report given to the nursing staff the patient was recently diagnosed with a urinary tract infection and was started on Macrobid and has taken 2 doses but has become progressively weak and today developed nausea vomiting and incontinence of soft stool and was therefore sent to the emergency department.  Further investigation and discussion with the patient's daughter and our  revealed that the patient is in hospice care and has a [facility] on file and she is only to receive oral medications and comfort care and the family is okay with her receiving oral antibiotics.  No other measures to prolong her life are to be undertaken.  I have spoken with the daughter myself over the phone and she made multiple attempts to contact [facility] today and there is no answer at that organization.  Our  has also made several attempts to contact [facility] and nobody is answering the phone.    REVIEW OF SYSTEMS the patient could provide no review of systems information    PAST MEDICAL HISTORY  Past Medical History:   Diagnosis Date   • Acute renal failure (ARF) (CMS-[facility]ly [facility]) (Formerly [facility]) 3/1/2015   • Cancer (CMS-Formerly [facility]) (Formerly [facility])     skin Ca removed   • Dementia, old age    • Diabetes mellitus type 2 in obese (CMS-Formerly [facility]) ([facility]) 8/15/2013   • Hearing disorder    • Hypertension    • Macular degeneration    • MEDICAL HOME    • UTI (urinary tract infection) 2/19/2015       FAMILY HISTORY  Family History   Problem Relation  "Age of Onset   • Cancer Father    • Psychiatry Brother        SOCIAL HISTORY  Social History     Social History   • Marital status:      Spouse name: N/A   • Number of children: N/A   • Years of education: N/A     Social History Main Topics   • Smoking status: Never Smoker   • Smokeless tobacco: Never Used   • Alcohol use No   • Drug use: No   • Sexual activity: Not on file     Other Topics Concern   • Not on file     Social History Narrative   • No narrative on file       SURGICAL HISTORY  Past Surgical History:   Procedure Laterality Date   • HIP HEMIARTHROPLASTY  2/21/2015    Performed by Brian Calixto M.D. at SURGERY [facility] ORS   • HYSTERECTOMY, TOTAL ABDOMINAL     • OTHER      TONSILLS, EAR SURG   • OTHER ABDOMINAL SURGERY         CURRENT MEDICATIONS  Home Medications     Reviewed by Evelyn Tamayo (Pharmacy Tech) on 09/03/18 at 1735  Med List Status: Complete   Medication Last Dose Status   acetaminophen (TYLENOL) 325 MG Tab 9/2/2018 Active   ALPRAZolam (XANAX) 0.25 MG Tab 9/2/2018 Active   amlodipine (NORVASC) 5 MG TABS 9/3/2018 Active   apixaban (ELIQUIS) 2.5mg Tab 9/3/2018 Active   docusate sodium (COLACE) 100 MG Cap 9/2/2018 Active   ketoconazole (NIZORAL) 2 % shampoo 9/1/2018 Active   lisinopril (PRINIVIL, ZESTRIL) 30 MG tablet 9/3/2018 Active   magnesium hydroxide (MILK OF MAGNESIA) 400 MG/5ML Suspension 9/1/2018 Active   nitrofurantoin monohydr macro (MACROBID) 100 MG Cap 9/3/2018 Active   omeprazole (PRILOSEC) 20 MG delayed-release capsule 9/3/2018 Active   polyethylene glycol/lytes (MIRALAX) Pack 9/3/2018 Active   senna-docusate (PERICOLACE OR SENOKOT S) 8.6-50 MG Tab 9/3/2018 Active                ALLERGIES  Allergies   Allergen Reactions   • Sulfa Drugs        PHYSICAL EXAM  VITAL SIGNS: /44   Pulse 62   Temp 37.1 °C (98.8 °F)   Resp 16   Ht 1.6 m (5' 3\")   Wt 54.4 kg (120 lb)   LMP 12/24/1960   SpO2 94%   Breastfeeding? No   BMI 21.26 kg/m²    Oxygen saturation is " interpreted as adequate  Constitutional: The patient is awake she does verbalize but she is very very confused secondary to dementia  HENT: Mucous membranes are dry  Eyes: No erythema discharge or jaundice  Neck: Trachea midline no JVD  Cardiovascular: Regular rate and rhythm  Lungs: Clear and equal bilaterally  Abdomen/Back: Soft nondistended nontender no peritoneal findings  Skin: Warm and dry  Musculoskeletal: No acute bony deformity  Neurologic: The patient is awake she is moving her extremities she is confused    Laboratory  Urinalysis was obtained showing evidence of continuing infection with leukocyte esterase positive, 20-50 white blood cells 20-50 red blood cells seen in the microscopic exam.      MEDICAL DECISION MAKING and DISPOSITION  After considering all the information above the patient was given oral Zofran for report of nausea and vomiting and she was given oral Tylenol for fever and started on oral Cipro for urinary tract infection.  Ideally the patient's care could continue with close involvement of her hospice agency unfortunately they cannot be reached today therefore the patient will be admitted to the hospitalist service for further care.    IMPRESSION  1.  Urinary tract infection  2.  Vomiting and diarrhea  3.  CODE STATUS is DNR, comfort care only, oral medications only.      Electronically signed by: Layo Alcala, 9/4/2018 9:02 AM

## 2018-09-04 NOTE — DISCHARGE PLANNING
ERP requested that SS reach out to pts family and [facility] hosp[facility] to discuss next steps in pts care. SW called pts daughter Berenice ([phone]) who requested that staff follow the pts POLST, as [facility] cannot be reached. Per Berenice, the [facility] cannot take the pt back until hospice is contacted or pts vomiting subsides. SW called [facility] several times, leaving a message requesting a return call. SW never received a call. SW called Berenice to update her on pts admission. Per Berenice, ERP updated her on pts admission. SW provided support and addressed all questions and encouraged follow up when Berenice stated that she had no further needs. Contact information for unit and ED SW provided. SS to remain available.

## 2018-09-04 NOTE — PROGRESS NOTES
0000 Rounded on patient, sleeping comfortably in bed, with mild unlabored breathing. Repositioned every two hours. Comfort measures provided.

## 2018-09-04 NOTE — PROGRESS NOTES
0445 Bowel movement noted, medium brown formed soft. Perineal care given, diaper changed, linen changed. Redness noted on coccyx, otherwise blanching and intact. Abrasion noted on right upper back. Skin preventive measures implemented, Mepilex sacral foam and Biatain foam dressing in place.

## 2018-09-04 NOTE — ASSESSMENT & PLAN NOTE
Patient's heart rate at this point is controlled. She does have irregular heart rate and she will continue at this point with Ellik was at a low dose of 2.5 mg.

## 2018-09-04 NOTE — CARE PLAN
Problem: Skin Integrity  Goal: Skin Integrity is maintained  Outcome: PROGRESSING AS EXPECTED  Interventions: Turning and repositioning every two hours. Use of skin preventive measures: pillows and foam in place to protect pressure points Checking diapers frequently for wetness and moisture.    Problem: Self Care Deficit  Goal: Oral Hygiene  Outcome: Met  Interventions: Oral hygiene given, used mouth swabs and moisturizer.

## 2018-09-04 NOTE — DISCHARGE PLANNING
Telephoned pt's daughter Berenice and advised ambulance  time will be 2pm. Also telephoned Mishel at [facility] who confirmed they will follow pt. Left voice message with return direct contact number for this writer, for Anila at [facility] with ambulance  time of 2pm. Requested call back from Anila.

## 2018-09-04 NOTE — PROGRESS NOTES
Assumed care of Pt at 1745 when Pt transferred to T218 via [facility] for Comfort Care, assessment complete.  Pt resting comfortably, VSS, on 2L o2 n/c; Pt in diaper - is clean and dry at this time; skin is fragile, some pinkness to buttocks, but no breakdown noted.  Pt is responsive but confused, does know her name; Pt has even, unlabored breathing, denies any pain, discomfort at this time.  White board updated, medications held as Pt is sleeping; bed in low position, call light within reach - will continue to monitor.

## 2018-09-04 NOTE — PROGRESS NOTES
IV dc'd.  Pt states personal belongings are in possession.  Pt escorted off unit by [facility] without incident.

## 2018-09-04 NOTE — DISCHARGE PLANNING
Received Choice form at 0969   Agency/Facility Name: [facility]   Referral sent per Choice form @ 4641.

## 2018-09-04 NOTE — DISCHARGE PLANNING
Received Transport Form @ 1790   Spoke to Kwame CEE    Transport is scheduled for 9/4 @1400 going to [place].  BRANDON Santiago notified.

## 2018-09-04 NOTE — ED NOTES
Med rec complete per MAR from [facility]  Antibiotics taken in the last 30 days: yes   Allergies verified and updated: yes per MAR

## 2018-09-04 NOTE — PROGRESS NOTES
1915 Received patient in bed sleeping, easily arousable, responsive to voice, able to state name and urge to void. Pt with unlabored breathing, denies any pain at this time. Patient repositioned to her side, checked diapers, clean and dry. Safety ans fall precautions in place. Promoted rest and comfort. Will continue to assess and monitor.

## 2018-09-04 NOTE — DISCHARGE PLANNING
Telephoned Anila at [facility] as no call back received. Anila confirmed she had received message of 2 pm ambulance  time. Anila also confirmed pt has caregiver assistance.

## 2018-09-04 NOTE — DISCHARGE INSTRUCTIONS
Discharge Instructions    Discharged to other by medical transportation with escort. Discharged via ambulance, hospital escort: Yes.  Special equipment needed: Not Applicable    Be sure to schedule a follow-up appointment with your primary care doctor or any specialists as instructed.     Discharge Plan:   Diet Plan: Discussed  Activity Level: Discussed  Confirmed Follow up Appointment: Patient to Call and Schedule Appointment  Confirmed Symptoms Management: Discussed  Medication Reconciliation Updated: Yes  Pneumococcal Vaccine Administered/Refused: Not given - Patient refused pneumococcal vaccine  Influenza Vaccine Indication: Indicated: 65 years and older    I understand that a diet low in cholesterol, fat, and sodium is recommended for good health. Unless I have been given specific instructions below for another diet, I accept this instruction as my diet prescription.   Other diet: Heart healthy     Special Instructions: None    · Is patient discharged on Warfarin / Coumadin?   No     Depression / Suicide Risk    As you are discharged from this [facility] facility, it is important to learn how to keep safe from harming yourself.    Recognize the warning signs:  · Abrupt changes in personality, positive or negative- including increase in energy   · Giving away possessions  · Change in eating patterns- significant weight changes-  positive or negative  · Change in sleeping patterns- unable to sleep or sleeping all the time   · Unwillingness or inability to communicate  · Depression  · Unusual sadness, discouragement and loneliness  · Talk of wanting to die  · Neglect of personal appearance   · Rebelliousness- reckless behavior  · Withdrawal from people/activities they love  · Confusion- inability to concentrate     If you or a loved one observes any of these behaviors or has concerns about self-harm, here's what you can do:  · Talk about it- your feelings and reasons for harming yourself  · Remove any means  that you might use to hurt yourself (examples: pills, rope, extension cords, firearm)  · Get professional help from the community (Mental Health, Substance Abuse, psychological counseling)  · Do not be alone:Call your Safe Contact- someone whom you trust who will be there for you.  · Call your local CRISIS HOTLINE 201-5322 or 737-092-0898  · Call your local Children's Mobile Crisis Response Team [place] (287) 865-9082 or www.Playtabase  · Call the toll free National Suicide Prevention Hotlines   · National Suicide Prevention Lifeline 271-421-ASBK (9658)  · National Hope Line Network 800-SUICIDE (262-7542)

## 2018-09-04 NOTE — PROGRESS NOTES
1945 Assisted patient with dinner meal tray. Able to chew and swallow food and oral meds. Aspiration precautions in place. Tolerated feeding well.

## 2018-09-04 NOTE — ASSESSMENT & PLAN NOTE
At this point the patient has a urinary tract infection which will be treated by Omnicef. The family at this point wishes for her to remain under comfort care measures and does not wish for to have IVs.

## 2018-09-04 NOTE — DISCHARGE PLANNING
Agency/Facility Name: [facility]   Spoke To: Anila  Outcome: D/C summary faxed to [facility] @ [phone].

## 2018-09-04 NOTE — DISCHARGE SUMMARY
Discharge Summary    CHIEF COMPLAINT ON ADMISSION  Chief Complaint   Patient presents with   • Nausea/Vomiting/Diarrhea     started today   • Weakness     increasing over past week   • UTI     started Macrobid x 2 doses   • Blood in Urine     x 1 week       Reason for Admission  Dysuria    CODE STATUS  Comfort Care/DNR    HPI & HOSPITAL COURSE  This is a 89 y.o. female here with chief complaint of dysuria and generalized weakness.  The patient is currently on hospice care at [place] ([facility] unit) with [facility].  Per outside facility patient was recently diagnosed with urinary tract infection and was treated with nitrofurantoin and had taken 2 doses, but became progressively weaker with associated nausea, vomiting, and incontinence of stool.  Because family could not contact hospice agency and no improvement with outpatient management with nitrofurantoin patient was subsequently transferred to [facility] emergency room for further treatment/management.  While in the emergency room and further questioning with patient's daughter patient by  she was confirmed to be on hospice care and did have a POLST on file but stated she is only to receive oral medications and to be on comfort care, and no IV antibiotics but oral antibiotics okay.  Thus, patient's daughter expressed no other measures to prolong her life are to be undertaken.  While in emergency room no laboratory work was done, however urinalysis was performed and did show glucose 100, large occult blood, protein 30, moderate leukocyte Estrace, WBCs 20-50, and RBCs 20-50.  Culture results pending at this time.  Therefore, patient was admitted for observation to clinical decision-making unit for further management/treatment of symptoms.  During hospital stay the patient was treated with ciprofloxacin 500 mg and received 1 dose.  Upon discharge patient with normal vital signs minimal complaints of dysuria and states no nausea or vomiting, and  tolerating oral intake well.  Discussed with patient's daughter overall care and plan for discharge, and agreed.  Due to patient with improvement of symptoms arrangements made for patient to be transferred back to [facility] memory care unit in to care of [facility] who was contacted and updated on plan post discharge.  Patient will be sent home on Omnicef 300 mg twice daily for an additional 6 days.  Recommendations for patient to be followed up with hospice physician for follow-up of current treatment.  Therefore, she is discharged in good and stable condition to skilled nursing facility.  The patient recovered much more quickly than anticipated on admission.        DISCHARGE DIAGNOSES  Active Problems:    Hypertension POA: Yes    UTI (urinary tract infection) POA: Yes    Paroxysmal atrial fibrillation (HCC) POA: Yes    GERD (gastroesophageal reflux disease) POA: Yes    Macular degeneration POA: Yes    Dementia POA: Yes    Constipation POA: Yes  Resolved Problems:    * No resolved hospital problems. *      FOLLOW UP  Recommendations for follow-up with hospice physician in the next week    MEDICATIONS ON DISCHARGE     Medication List      START taking these medications      Instructions   cefdinir 300 MG Caps  Commonly known as:  OMNICEF   Take 1 Cap by mouth 2 times a day for 6 days.  Dose:  300 mg        CONTINUE taking these medications      Instructions   acetaminophen 325 MG Tabs  Commonly known as:  TYLENOL   Take 650 mg by mouth every four hours as needed for Moderate Pain or Fever. Temperature greater than 100  Dose:  650 mg     ALPRAZolam 0.25 MG Tabs  Commonly known as:  XANAX   Take 0.25 mg by mouth every four hours as needed for Anxiety (pacing/anxiety/tearful).  Dose:  0.25 mg     amLODIPine 5 MG Tabs  Commonly known as:  NORVASC   Take 5 mg by mouth every morning.  Dose:  5 mg     docusate sodium 100 MG Caps  Commonly known as:  COLACE   Take 100 mg by mouth 1 time daily as needed for Constipation  (hard stools & constipation).  Dose:  100 mg     ELIQUIS 2.5mg Tabs  Generic drug:  apixaban   Take 2.5 mg by mouth 2 Times a Day.  Dose:  2.5 mg     ketoconazole 2 % shampoo  Commonly known as:  NIZORAL   Apply  to affected area(s) 2X A WEEK.     lisinopril 30 MG tablet  Commonly known as:  PRINIVIL, ZESTRIL   Take 30 mg by mouth every morning.  Dose:  30 mg     magnesium hydroxide 400 MG/5ML Susp  Commonly known as:  MILK OF MAGNESIA   Take 30 mL by mouth 1 time daily as needed (constipation).  Dose:  30 mL     omeprazole 20 MG delayed-release capsule  Commonly known as:  PRILOSEC   Take 20 mg by mouth every day.  Dose:  20 mg     polyethylene glycol/lytes Pack  Commonly known as:  MIRALAX   Take 17 g by mouth every day.  Dose:  17 g     senna-docusate 8.6-50 MG Tabs  Commonly known as:  PERICOLACE or SENOKOT S   Take 1 Tab by mouth every day.  Dose:  1 Tab        STOP taking these medications    nitrofurantoin monohydr macro 100 MG Caps  Commonly known as:  MACROBID            Allergies  Allergies   Allergen Reactions   • Sulfa Drugs        DIET  Orders Placed This Encounter   Procedures   • Diet Order Regular     Standing Status:   Standing     Number of Occurrences:   1     Order Specific Question:   Diet:     Answer:   Regular [1]       ACTIVITY  As tolerated.  Weight bearing as tolerated    LINES, DRAINS, AND WOUNDS  This is an automated list. Peripheral IVs will be removed prior to discharge.  PIV Group Right Forearm 20g Saline Lock (Active)   Line Secured Transparent;Taped;Securement Dressing 9/3/2018  7:30 PM   Site Condition / Description Assessed;Patent;Clean;Dry;Intact 9/3/2018  7:30 PM   Dressing Type / Description Transparent;Tegaderm Advanced;Occlusive;Clean;Dry;Intact 9/3/2018  7:30 PM   Dressing Status Observed 9/3/2018  7:30 PM   Saline Locked Yes 9/3/2018  7:30 PM   Infiltration Grading Used by [facility] 0 9/3/2018  7:30 PM   Phlebitis Scale (Used by [facility]) 0 9/3/2018  7:30 PM        Surgical Incision  Incision Right Hip (Active)       Surgical Incision  Incision Right Lateral Hip (Active)       Incontinence Associated Dermatitis Buttock;Perineum (Active)       Pressure Ulcer  Left Medial Foot;Digit 1 Present on Admission;Unstageable (Active)       Pressure Ulcer  Deep Tissue Injury Heel Right (Active)       Skin Tear Right Elbow (Active)       Skin Tear Left Elbow (Active)       Wound Skin Tear Leg Left Posterior (Active)                  MENTAL STATUS ON TRANSFER  Level of Consciousness: Alert  Orientation :  (Oriented to person, name)     CONSULTATIONS  None    PROCEDURES  None    LABORATORY  Lab Results   Component Value Date    SODIUM 135 12/12/2017    POTASSIUM 3.9 12/12/2017    CHLORIDE 107 12/12/2017    CO2 21 12/12/2017    GLUCOSE 103 (H) 12/12/2017    BUN 13 12/12/2017    CREATININE 0.62 12/12/2017    CREATININE 0.9 11/29/2008        Lab Results   Component Value Date    WBC 5.2 12/12/2017    HEMOGLOBIN 12.5 12/12/2017    HEMATOCRIT 38.0 12/12/2017    PLATELETCT 318 12/12/2017          Total time of the discharge process exceeds 35 minutes.    JOSIAS Grullon.

## 2018-09-04 NOTE — DISCHARGE PLANNING
Telephoned [facility] [phone] and spoke with Anila who confirmed they can accept pt back on Hospice. Telephoned [facility] [phone] and spoke with Annette and Mishel who confirmed they can accept pt back to [facility] on Hospice. Annette advised she had been in contact with pt's daughter Berenice [phone] and she agreed for her Mum to return home with [facility]. Mishel requested referral be faxed to [phone].    Telephoned pt's daughter Berenice who said that she wanted [facility] hosp[facility] to resume care at [facility]. Discussed choice form with Berenice and she confirmed she wanted [facility] hospice - form faxed to [facility] with request to fax referral to [phone]. BRENDA Burr informed and requested order for pt to return to [facility] on Hospice. Advised Berenice this writer will call her when final discharge plans are made and ambulance  time established.

## 2018-09-04 NOTE — ASSESSMENT & PLAN NOTE
Continue at this point with lisinopril and Norvasc. No when necessary's as the patient is Comfort Care and the patient's family does not wish for any IVs to be administered.

## 2018-09-04 NOTE — H&P
Hospital Medicine History & Physical Note    Date of Service  9/3/2018    Primary Care Physician  Denver J Miller, M.D.    Consultants  none    Code Status  DNR/DNI Comfort care    Chief Complaint  Dysuria    History of Presenting Illness  89 y.o. female who presented 9/3/2018 with generalized weakness. The patient is on hospice care at an outside facility with [facility]. The family could not contact the hospice agency and she has failed outpatient management with nitrofurantoin. The patient at this point will be placed on Omnicef 300 mg twice daily for a five-day course to get rid of her failed outpatient management of urinary tract infection. The family does not wish work to have any IV antibiotics. The patient at this point we'll continue with pain management. Tomorrow social work will try to contact the family hospice group and decide the best course of action for her.    Review of Systems  Review of Systems   Constitutional: Negative for chills, diaphoresis and fever.   HENT: Negative.    Eyes: Negative.  Negative for double vision.   Respiratory: Negative.  Negative for cough, hemoptysis and wheezing.    Cardiovascular: Negative.  Negative for chest pain, palpitations and leg swelling.   Gastrointestinal: Negative.  Negative for abdominal pain, blood in stool, constipation, diarrhea, heartburn, nausea and vomiting.   Genitourinary: Negative.  Negative for frequency, hematuria and urgency.   Musculoskeletal: Negative.  Negative for joint pain.   Skin: Negative.  Negative for itching and rash.   Neurological: Positive for weakness. Negative for dizziness, focal weakness, seizures, loss of consciousness and headaches.   Endo/Heme/Allergies: Negative.  Does not bruise/bleed easily.   Psychiatric/Behavioral: Negative.  Negative for suicidal ideas. The patient is not nervous/anxious.    All other systems reviewed and are negative.      Past Medical History   has a past medical history of Acute renal failure  (ARF) (CMS-HCC) ([facility]) (3/1/2015); Cancer (CMS-HCC) ([facility]); Dementia, old age; Diabetes mellitus type 2 in obese (CMS-HCC) ([facility]) (8/15/2013); Hearing disorder; Hypertension; Macular degeneration; [facility]; and UTI (urinary tract infection) (2/19/2015). She also has no past medical history of ASTHMA; CAD (coronary artery disease); Congestive heart failure (CMS-HCC) ([facility]); COPD; Liver disease; Seizure disorder (CMS-HCC) ([facility]); or Stroke (CMS-HCC) ([facility]).    Surgical History   has a past surgical history that includes hysterectomy, total abdominal; other abdominal surgery; other; and hip hemiarthroplasty (2/21/2015).     Family History  family history includes Cancer in her father; Psychiatry in her brother.     Social History   reports that she has never smoked. She has never used smokeless tobacco. She reports that she does not drink alcohol or use drugs.    Allergies  Allergies   Allergen Reactions   • Sulfa Drugs        Medications  Prior to Admission Medications   Prescriptions Last Dose Informant Patient Reported? Taking?   Calcium Carb-Cholecalciferol (OYSTER SHELL CALCIUM/VITAMIN D) 250-125 MG-UNIT Tab tablet 11/18/2017 at 0800 MAR from Other Facility Yes No   Sig: Take 1 Tab by mouth 2 Times a Day.   Multiple Vitamins-Minerals (OCUVITE) TABS 11/18/2017 at 0800 MAR from Other Facility Yes No   Sig: Take 1 Tab by mouth every morning.   amlodipine (NORVASC) 5 MG TABS 11/18/2017 at 0800 MAR from Other Facility Yes No   Sig: Take 5 mg by mouth every morning.   apixaban (ELIQUIS) 2.5mg Tab 11/18/2017 at 1700 MAR from Other Facility Yes No   Sig: Take 2.5 mg by mouth 2 Times a Day.   famotidine (PEPCID) 20 MG Tab 11/18/2017 at 0800 MAR from Other Facility Yes No   Sig: Take 20 mg by mouth every day.   lisinopril (PRINIVIL, ZESTRIL) 30 MG tablet 11/18/2017 at 0800 MAR from Other Facility Yes No   Sig: Take 30 mg by mouth every morning.   polyethylene glycol/lytes (MIRALAX) Pack 11/18/2017 at 0800 MAR from Other  Facility Yes No   Sig: Take 17 g by mouth every day.   senna-docusate (PERICOLACE OR SENOKOT S) 8.6-50 MG Tab 11/18/2017 at 0800 MAR from Other Facility Yes No   Sig: Take 1 Tab by mouth every day.   triamcinolone acetonide (KENALOG) 0.1 % Cream 11/18/2017 at 0800 MAR from Other Facility Yes No   Sig: Apply 1 Application to affected area(s) every day.   vitamin D (CHOLECALCIFEROL) 1000 UNIT Tab 11/18/2017 at 0800 MAR from Other Facility Yes No   Sig: Take 2,000 Units by mouth every morning.      Facility-Administered Medications: None       Physical Exam  Blood Pressure : (!) 129/23   Temperature: (!) 39.1 °C (102.3 °F)   Pulse: 85   Respiration: 16   Pulse Oximetry: 96 %     Physical Exam   Constitutional: She is oriented to person, place, and time. She appears well-developed and well-nourished.   HENT:   Head: Normocephalic and atraumatic.   Right Ear: External ear normal.   Left Ear: External ear normal.   Nose: Nose normal.   Mouth/Throat: Oropharynx is clear and moist.   Eyes: Pupils are equal, round, and reactive to light. Conjunctivae and EOM are normal.   Neck: Normal range of motion. Neck supple. No JVD present. No thyromegaly present.   Cardiovascular: Normal rate.  An irregular rhythm present.   No murmur heard.  Pulmonary/Chest: She has no wheezes. She has no rales. She exhibits no tenderness.   Abdominal: She exhibits no distension and no mass. There is no tenderness. There is no rebound and no guarding.   Musculoskeletal: Normal range of motion. She exhibits no edema or tenderness.   Lymphadenopathy:     She has no cervical adenopathy.   Neurological: She is alert and oriented to person, place, and time. She has normal reflexes. No cranial nerve deficit.   Skin: Skin is warm and dry. No rash noted. No erythema.   Psychiatric: She has a normal mood and affect.   Nursing note and vitals reviewed.      Laboratory:          No results for input(s): ALTSGPT, ASTSGOT, ALKPHOSPHAT, TBILIRUBIN, DBILIRUBIN,  GAMMAGT, AMYLASE, LIPASE, ALB, PREALBUMIN, GLUCOSE in the last 72 hours.              Lab Results   Component Value Date    TROPONINI <0.01 12/12/2017       Urinalysis:    Recent Labs      09/03/18   1540   SPECGRAVITY  1.016   GLUCOSEUR  100*   KETONES  Negative   NITRITE  Negative   LEUKESTERAS  Moderate*   WBCURINE  20-50*   RBCURINE  20-50*   BACTERIA  Negative   EPITHELCELL  Negative        Imaging:  No orders to display         Assessment/Plan:  I anticipate this patient is appropriate for observation status at this time.    Paroxysmal atrial fibrillation (HCC)- (present on admission)   Assessment & Plan    Patient's heart rate at this point is controlled. She does have irregular heart rate and she will continue at this point with Ellik was at a low dose of 2.5 mg.        UTI (urinary tract infection)- (present on admission)   Assessment & Plan    At this point the patient has a urinary tract infection which will be treated by Omnicef. The family at this point wishes for her to remain under comfort care measures and does not wish for to have IVs.        Hypertension- (present on admission)   Assessment & Plan    Continue at this point with lisinopril and Norvasc. No when necessary's as the patient is Comfort Care and the patient's family does not wish for any IVs to be administered.        Constipation- (present on admission)   Assessment & Plan    Continue with bowel management        Dementia- (present on admission)   Assessment & Plan    Chronic but stable, she is pleasantly confused.        Macular degeneration- (present on admission)   Assessment & Plan    Continue with eyedrops.        GERD (gastroesophageal reflux disease)- (present on admission)   Assessment & Plan    Monitor for heartburn currently patient is not complaining in any heartburn continue with Pepcid.            VTE prophylaxis: SCD's

## 2018-09-04 NOTE — PROGRESS NOTES
Telephoned patient's daughter, Berenice and discussed discharge instructions, questions answered.

## 2018-09-05 LAB
BACTERIA UR CULT: NORMAL
SIGNIFICANT IND 70042: NORMAL
SITE SITE: NORMAL
SOURCE SOURCE: NORMAL

## 2018-11-19 ENCOUNTER — APPOINTMENT (OUTPATIENT)
Dept: RADIOLOGY | Facility: MEDICAL CENTER | Age: 83
End: 2018-11-19
Attending: EMERGENCY MEDICINE
Payer: MEDICARE

## 2018-11-19 ENCOUNTER — HOSPITAL ENCOUNTER (EMERGENCY)
Facility: MEDICAL CENTER | Age: 83
End: 2018-11-19
Attending: EMERGENCY MEDICINE
Payer: MEDICARE

## 2018-11-19 VITALS
WEIGHT: 120 LBS | HEART RATE: 58 BPM | DIASTOLIC BLOOD PRESSURE: 41 MMHG | RESPIRATION RATE: 18 BRPM | BODY MASS INDEX: 23.56 KG/M2 | SYSTOLIC BLOOD PRESSURE: 149 MMHG | TEMPERATURE: 98.4 F | OXYGEN SATURATION: 94 % | HEIGHT: 60 IN

## 2018-11-19 DIAGNOSIS — R11.2 NON-INTRACTABLE VOMITING WITH NAUSEA, UNSPECIFIED VOMITING TYPE: ICD-10-CM

## 2018-11-19 DIAGNOSIS — R10.32 LEFT LOWER QUADRANT PAIN: ICD-10-CM

## 2018-11-19 LAB
ALBUMIN SERPL BCP-MCNC: 4 G/DL (ref 3.2–4.9)
ALBUMIN/GLOB SERPL: 1.3 G/DL
ALP SERPL-CCNC: 86 U/L (ref 30–99)
ALT SERPL-CCNC: 7 U/L (ref 2–50)
ANION GAP SERPL CALC-SCNC: 7 MMOL/L (ref 0–11.9)
AST SERPL-CCNC: 13 U/L (ref 12–45)
BASOPHILS # BLD AUTO: 0.5 % (ref 0–1.8)
BASOPHILS # BLD: 0.05 K/UL (ref 0–0.12)
BILIRUB SERPL-MCNC: 0.5 MG/DL (ref 0.1–1.5)
BUN SERPL-MCNC: 17 MG/DL (ref 8–22)
CALCIUM SERPL-MCNC: 9.2 MG/DL (ref 8.5–10.5)
CHLORIDE SERPL-SCNC: 107 MMOL/L (ref 96–112)
CO2 SERPL-SCNC: 28 MMOL/L (ref 20–33)
CREAT SERPL-MCNC: 0.78 MG/DL (ref 0.5–1.4)
EOSINOPHIL # BLD AUTO: 0.16 K/UL (ref 0–0.51)
EOSINOPHIL NFR BLD: 1.6 % (ref 0–6.9)
ERYTHROCYTE [DISTWIDTH] IN BLOOD BY AUTOMATED COUNT: 44.8 FL (ref 35.9–50)
GLOBULIN SER CALC-MCNC: 3.2 G/DL (ref 1.9–3.5)
GLUCOSE SERPL-MCNC: 124 MG/DL (ref 65–99)
HCT VFR BLD AUTO: 41.8 % (ref 37–47)
HGB BLD-MCNC: 13.8 G/DL (ref 12–16)
IMM GRANULOCYTES # BLD AUTO: 0.02 K/UL (ref 0–0.11)
IMM GRANULOCYTES NFR BLD AUTO: 0.2 % (ref 0–0.9)
LYMPHOCYTES # BLD AUTO: 1.04 K/UL (ref 1–4.8)
LYMPHOCYTES NFR BLD: 10.5 % (ref 22–41)
MCH RBC QN AUTO: 29.9 PG (ref 27–33)
MCHC RBC AUTO-ENTMCNC: 33 G/DL (ref 33.6–35)
MCV RBC AUTO: 90.5 FL (ref 81.4–97.8)
MONOCYTES # BLD AUTO: 0.69 K/UL (ref 0–0.85)
MONOCYTES NFR BLD AUTO: 7 % (ref 0–13.4)
NEUTROPHILS # BLD AUTO: 7.9 K/UL (ref 2–7.15)
NEUTROPHILS NFR BLD: 80.2 % (ref 44–72)
NRBC # BLD AUTO: 0 K/UL
NRBC BLD-RTO: 0 /100 WBC
PLATELET # BLD AUTO: 336 K/UL (ref 164–446)
PMV BLD AUTO: 9.2 FL (ref 9–12.9)
POTASSIUM SERPL-SCNC: 3.8 MMOL/L (ref 3.6–5.5)
PROT SERPL-MCNC: 7.2 G/DL (ref 6–8.2)
RBC # BLD AUTO: 4.62 M/UL (ref 4.2–5.4)
SODIUM SERPL-SCNC: 142 MMOL/L (ref 135–145)
WBC # BLD AUTO: 9.9 K/UL (ref 4.8–10.8)

## 2018-11-19 PROCEDURE — 99285 EMERGENCY DEPT VISIT HI MDM: CPT

## 2018-11-19 PROCEDURE — 74022 RADEX COMPL AQT ABD SERIES: CPT

## 2018-11-19 PROCEDURE — 85025 COMPLETE CBC W/AUTO DIFF WBC: CPT

## 2018-11-19 PROCEDURE — 80053 COMPREHEN METABOLIC PANEL: CPT

## 2018-11-19 RX ORDER — ONDANSETRON 4 MG/1
4 TABLET, ORALLY DISINTEGRATING ORAL EVERY 8 HOURS PRN
Qty: 10 TAB | Refills: 0 | Status: SHIPPED | OUTPATIENT
Start: 2018-11-19 | End: 2019-01-01 | Stop reason: CLARIF

## 2018-11-19 ASSESSMENT — PAIN SCALES - GENERAL: PAINLEVEL_OUTOF10: 3

## 2018-11-20 NOTE — ED TRIAGE NOTES
.  Chief Complaint   Patient presents with   • Bowel Obstruction     Possible   • Abdominal Pain     Mild, Low left sided   Pt BIB EMS from [facility](assisted nursing [facility]). EMS contacted by hospice RN.  Pt alert and oriented X 1.  Pt had small bowel movement this morning.  Last normal bowel movement?  Pt c/o mild left abdominal pain.  No N/V. Staff attempted enemas X 2.

## 2018-11-20 NOTE — ED PROVIDER NOTES
ED Provider Note    Scribed for Akhil Delaney M.D. by Ines Bowser. 11/19/2018, 7:26 PM.    Primary care provider: Denver J Miller, M.D.  Means of arrival: EMS  History obtained from: patient's family and patient  History limited by: patient's dementia     CHIEF COMPLAINT  Chief Complaint   Patient presents with   • Bowel Obstruction     Possible   • Abdominal Pain     Mild, Low left sided       HPI  Daysi Farmer is a 89 y.o. female with a history of dementia who presents to the Emergency Department via EMS from an assisted nursing facility for evaluation of a possible bowel obstruction today. Patient reports associated abdominal pain with palpation. Per family, the patient has not been feeling well over the past several days and has experienced constipation and vomiting. She did received enemas this morning without relief. Patient has not been eating or sleeping the past couple of days as well. No back pain.     Further HPI is limited secondary to the patient's dementia.     REVIEW OF SYSTEMS  Pertinent positives include abdominal pain with palpation, constipation, vomiting. Pertinent negatives include back pain.    Further ROS is limited secondary to the patient's dementia.     PAST MEDICAL HISTORY   has a past medical history of Acute renal failure (ARF) ([facility]) (3/1/2015); Cancer ([facility]); Dementia, old age; Diabetes mellitus type 2 in obese ([facility]) (8/15/2013); Hearing disorder; Hypertension; Macular degeneration; [facility]; and UTI (urinary tract infection) (2/19/2015).    SURGICAL HISTORY   has a past surgical history that includes hysterectomy, total abdominal; other abdominal surgery; other; and hip hemiarthroplasty (2/21/2015).    SOCIAL HISTORY  Social History   Substance Use Topics   • Smoking status: Never Smoker   • Smokeless tobacco: Never Used   • Alcohol use No      History   Drug Use No       FAMILY HISTORY  Family History   Problem Relation Age of Onset   • Cancer Father    • Psychiatry  Brother        CURRENT MEDICATIONS  Current medications can be reviewed in the nurse's note.     ALLERGIES  Allergies   Allergen Reactions   • Sulfa Drugs        PHYSICAL EXAM  VITAL SIGNS: /41   Pulse 67   Temp 36.9 °C (98.4 °F) (Temporal)   Resp 18   Ht 1.524 m (5')   Wt 54.4 kg (120 lb)   LMP 12/24/1960   BMI 23.44 kg/m²     Constitutional: Well developed, Well nourished, no acute distress.   HENT: Normocephalic, Atraumatic.   Eyes: Conjunctiva normal, No discharge.   Cardiovascular: Normal heart rate, Normal rhythm, No murmurs, equal pulses.   Pulmonary: Normal breath sounds, No respiratory distress, No wheezing, No rales, No rhonchi.  Chest: No chest wall tenderness or deformity.   Abdomen:Soft, mild tenderness to the low abdomen, normal bowel sounds, No masses, no rebound, no guarding.   Back: No CVA tenderness.   Musculoskeletal: No major deformities noted.   Skin: Warm, Dry, No erythema, No rash.   Neurologic: Alert to self, not situation, year, or date, Normal motor function,  No focal deficits noted.   Psychiatric: Affect normal, Mood normal.     LABS  Results for orders placed or performed during the hospital encounter of 11/19/18   CBC WITH DIFFERENTIAL   Result Value Ref Range    WBC 9.9 4.8 - 10.8 K/uL    RBC 4.62 4.20 - 5.40 M/uL    Hemoglobin 13.8 12.0 - 16.0 g/dL    Hematocrit 41.8 37.0 - 47.0 %    MCV 90.5 81.4 - 97.8 fL    MCH 29.9 27.0 - 33.0 pg    MCHC 33.0 (L) 33.6 - 35.0 g/dL    RDW 44.8 35.9 - 50.0 fL    Platelet Count 336 164 - 446 K/uL    MPV 9.2 9.0 - 12.9 fL    Neutrophils-Polys 80.20 (H) 44.00 - 72.00 %    Lymphocytes 10.50 (L) 22.00 - 41.00 %    Monocytes 7.00 0.00 - 13.40 %    Eosinophils 1.60 0.00 - 6.90 %    Basophils 0.50 0.00 - 1.80 %    Immature Granulocytes 0.20 0.00 - 0.90 %    Nucleated RBC 0.00 /100 WBC    Neutrophils (Absolute) 7.90 (H) 2.00 - 7.15 K/uL    Lymphs (Absolute) 1.04 1.00 - 4.80 K/uL    Monos (Absolute) 0.69 0.00 - 0.85 K/uL    Eos (Absolute) 0.16  0.00 - 0.51 K/uL    Baso (Absolute) 0.05 0.00 - 0.12 K/uL    Immature Granulocytes (abs) 0.02 0.00 - 0.11 K/uL    NRBC (Absolute) 0.00 K/uL   COMP METABOLIC PANEL   Result Value Ref Range    Sodium 142 135 - 145 mmol/L    Potassium 3.8 3.6 - 5.5 mmol/L    Chloride 107 96 - 112 mmol/L    Co2 28 20 - 33 mmol/L    Anion Gap 7.0 0.0 - 11.9    Glucose 124 (H) 65 - 99 mg/dL    Bun 17 8 - 22 mg/dL    Creatinine 0.78 0.50 - 1.40 mg/dL    Calcium 9.2 8.5 - 10.5 mg/dL    AST(SGOT) 13 12 - 45 U/L    ALT(SGPT) 7 2 - 50 U/L    Alkaline Phosphatase 86 30 - 99 U/L    Total Bilirubin 0.5 0.1 - 1.5 mg/dL    Albumin 4.0 3.2 - 4.9 g/dL    Total Protein 7.2 6.0 - 8.2 g/dL    Globulin 3.2 1.9 - 3.5 g/dL    A-G Ratio 1.3 g/dL   ESTIMATED GFR   Result Value Ref Range    GFR If African American >60 >60 mL/min/1.73 m 2    GFR If Non African American >60 >60 mL/min/1.73 m 2     All labs reviewed by me.    RADIOLOGY  DX-ABDOMEN COMPLETE WITH AP OR PA CXR   Final Result      1.  No acute abnormalities are noted on single view chest.   2.  No acute abnormalities are noted on abdominal radiographs.   3.  No abnormal stool burden        The radiologist's interpretation of all radiological studies have been reviewed by me.    COURSE & MEDICAL DECISION MAKING  Pertinent Labs & Imaging studies reviewed. (See chart for details)    7:26 PM - Patient seen and examined at bedside. Ordered DX abdomen, CBC, CMP to evaluate her symptoms. The differential diagnoses include but are not limited to: Small bowel obstruction, constipation, electrolyte abnormality, dehydration.     9:01 PM- Reviewed the patient's lab and imaging results.     9:09 PM- Patient rechecked at bedside. The patient was updated on diagnostic test results as seen above. The patient will be discharged, status improved, with instructions regarding supportive care and medication. Instructions were given for follow-up. Discussed indications for seeking immediate medical attention. Patient was  given the opportunity for questions. The patient understands and agrees.     Medical Decision Making: At this point time I do not have any signs that the patient actually has a bowel obstruction.  Does not showed any signs of significant constipation or fecal impaction.  Given the fact that she is already on hospice I do not think further imaging is warranted or significant labs.  Patient is comfortable at this point in time.  Labs are unremarkable.    The patient will return for new or worsening symptoms and is stable at the time of discharge.    DISPOSITION:  Patient will be discharged home in stable condition.    FOLLOW UP:  Denver J Miller, M.D.  [address] [place] [address] [place] [id number]  [phone]    Schedule an appointment as soon as possible for a visit in 2 days      OUTPATIENT MEDICATIONS:  Discharge Medication List as of 11/19/2018  9:33 PM      START taking these medications    Details   ondansetron (ZOFRAN ODT) 4 MG TABLET DISPERSIBLE Take 1 Tab by mouth every 8 hours as needed., Disp-10 Tab, R-0, Print Rx Paper           FINAL IMPRESSION  1. Non-intractable vomiting with nausea, unspecified vomiting type    2. Left lower quadrant pain        Ines ARELLANO (Víctor), am scribing for, and in the presence of, Akhil Delaney M.D.    Electronically signed by: Ines Bowser (Víctor), 11/19/2018    Akhil ARELLANO M.D. personally performed the services described in this documentation, as scribed by Ines Bowser in my presence, and it is both accurate and complete. C.     The note accurately reflects work and decisions made by me.  Akhil Delaney  11/20/2018  3:11 AM

## 2018-11-20 NOTE — DISCHARGE PLANNING
Medical Social Work    MSW received a call from bedside RN that pt is ready to return to [facility] the [facility] and will need [facility].  Pt's family is unable to transport pt and pt has dementia and is on hospice per records.  MSW contacted Haley with [facility] ([phone]; [address], Room [address]) who will accept pt back.  MSW faxed PCS and facesheet to [facility] and made follow up phone call to [facility] to arrange transport for 2230.  MSW updated bedside RN and family of transport time.    Plan: Pt to D/C to [facility] via [facility] at 2230.

## 2018-11-20 NOTE — ED NOTES
PT discharged home.   Transported by [facility]. VSS on RA.    Pt discharged, discharge instructions given. Prescription given. Family verbalizes understanding. VSS on RA. All belongings accounted for.

## 2019-01-01 ENCOUNTER — HOSPITAL ENCOUNTER (INPATIENT)
Facility: MEDICAL CENTER | Age: 84
LOS: 1 days | DRG: 871 | End: 2019-01-02
Attending: EMERGENCY MEDICINE | Admitting: INTERNAL MEDICINE
Payer: MEDICARE

## 2019-01-01 ENCOUNTER — APPOINTMENT (OUTPATIENT)
Dept: RADIOLOGY | Facility: MEDICAL CENTER | Age: 84
DRG: 871 | End: 2019-01-01
Attending: EMERGENCY MEDICINE
Payer: MEDICARE

## 2019-01-01 DIAGNOSIS — N30.00 ACUTE CYSTITIS WITHOUT HEMATURIA: ICD-10-CM

## 2019-01-01 DIAGNOSIS — R65.10 SIRS (SYSTEMIC INFLAMMATORY RESPONSE SYNDROME) (HCC): ICD-10-CM

## 2019-01-01 PROBLEM — J69.0 ASPIRATION PNEUMONIA (HCC): Status: ACTIVE | Noted: 2019-01-01

## 2019-01-01 PROBLEM — A41.9 SEPSIS (HCC): Status: ACTIVE | Noted: 2019-01-01

## 2019-01-01 PROBLEM — Z79.01 CHRONIC ANTICOAGULATION: Status: ACTIVE | Noted: 2019-01-01

## 2019-01-01 LAB
ALBUMIN SERPL BCP-MCNC: 3.6 G/DL (ref 3.2–4.9)
ALBUMIN/GLOB SERPL: 1.2 G/DL
ALP SERPL-CCNC: 98 U/L (ref 30–99)
ALT SERPL-CCNC: 40 U/L (ref 2–50)
ANION GAP SERPL CALC-SCNC: 9 MMOL/L (ref 0–11.9)
APPEARANCE UR: CLEAR
AST SERPL-CCNC: 76 U/L (ref 12–45)
BACTERIA #/AREA URNS HPF: NEGATIVE /HPF
BASOPHILS # BLD AUTO: 0.4 % (ref 0–1.8)
BASOPHILS # BLD: 0.04 K/UL (ref 0–0.12)
BILIRUB SERPL-MCNC: 0.5 MG/DL (ref 0.1–1.5)
BILIRUB UR QL STRIP.AUTO: NEGATIVE
BUN SERPL-MCNC: 16 MG/DL (ref 8–22)
CALCIUM SERPL-MCNC: 9.3 MG/DL (ref 8.5–10.5)
CHLORIDE SERPL-SCNC: 107 MMOL/L (ref 96–112)
CO2 SERPL-SCNC: 21 MMOL/L (ref 20–33)
COLOR UR: YELLOW
CREAT SERPL-MCNC: 0.79 MG/DL (ref 0.5–1.4)
EOSINOPHIL # BLD AUTO: 0.03 K/UL (ref 0–0.51)
EOSINOPHIL NFR BLD: 0.3 % (ref 0–6.9)
EPI CELLS #/AREA URNS HPF: NEGATIVE /HPF
ERYTHROCYTE [DISTWIDTH] IN BLOOD BY AUTOMATED COUNT: 44.4 FL (ref 35.9–50)
GLOBULIN SER CALC-MCNC: 2.9 G/DL (ref 1.9–3.5)
GLUCOSE SERPL-MCNC: 134 MG/DL (ref 65–99)
GLUCOSE UR STRIP.AUTO-MCNC: NEGATIVE MG/DL
HCT VFR BLD AUTO: 42.1 % (ref 37–47)
HGB BLD-MCNC: 13.8 G/DL (ref 12–16)
HYALINE CASTS #/AREA URNS LPF: ABNORMAL /LPF
IMM GRANULOCYTES # BLD AUTO: 0.03 K/UL (ref 0–0.11)
IMM GRANULOCYTES NFR BLD AUTO: 0.3 % (ref 0–0.9)
KETONES UR STRIP.AUTO-MCNC: NEGATIVE MG/DL
LACTATE BLD-SCNC: 2.4 MMOL/L (ref 0.5–2)
LACTATE BLD-SCNC: 3.3 MMOL/L (ref 0.5–2)
LEUKOCYTE ESTERASE UR QL STRIP.AUTO: ABNORMAL
LYMPHOCYTES # BLD AUTO: 0.06 K/UL (ref 1–4.8)
LYMPHOCYTES NFR BLD: 0.6 % (ref 22–41)
MCH RBC QN AUTO: 29.4 PG (ref 27–33)
MCHC RBC AUTO-ENTMCNC: 32.8 G/DL (ref 33.6–35)
MCV RBC AUTO: 89.6 FL (ref 81.4–97.8)
MICRO URNS: ABNORMAL
MONOCYTES # BLD AUTO: 0.06 K/UL (ref 0–0.85)
MONOCYTES NFR BLD AUTO: 0.6 % (ref 0–13.4)
NEUTROPHILS # BLD AUTO: 9.08 K/UL (ref 2–7.15)
NEUTROPHILS NFR BLD: 97.8 % (ref 44–72)
NITRITE UR QL STRIP.AUTO: NEGATIVE
NRBC # BLD AUTO: 0 K/UL
NRBC BLD-RTO: 0 /100 WBC
PH UR STRIP.AUTO: 6.5 [PH]
PLATELET # BLD AUTO: 264 K/UL (ref 164–446)
PMV BLD AUTO: 9.2 FL (ref 9–12.9)
POTASSIUM SERPL-SCNC: 3.3 MMOL/L (ref 3.6–5.5)
PROT SERPL-MCNC: 6.5 G/DL (ref 6–8.2)
PROT UR QL STRIP: NEGATIVE MG/DL
RBC # BLD AUTO: 4.7 M/UL (ref 4.2–5.4)
RBC # URNS HPF: ABNORMAL /HPF
RBC UR QL AUTO: ABNORMAL
SODIUM SERPL-SCNC: 137 MMOL/L (ref 135–145)
SP GR UR STRIP.AUTO: 1.01
UROBILINOGEN UR STRIP.AUTO-MCNC: 0.2 MG/DL
WBC # BLD AUTO: 9.3 K/UL (ref 4.8–10.8)
WBC #/AREA URNS HPF: ABNORMAL /HPF

## 2019-01-01 PROCEDURE — 87040 BLOOD CULTURE FOR BACTERIA: CPT | Mod: 91

## 2019-01-01 PROCEDURE — 96375 TX/PRO/DX INJ NEW DRUG ADDON: CPT

## 2019-01-01 PROCEDURE — 96365 THER/PROPH/DIAG IV INF INIT: CPT

## 2019-01-01 PROCEDURE — 304561 HCHG STAT O2

## 2019-01-01 PROCEDURE — 99285 EMERGENCY DEPT VISIT HI MDM: CPT

## 2019-01-01 PROCEDURE — 80053 COMPREHEN METABOLIC PANEL: CPT

## 2019-01-01 PROCEDURE — 700111 HCHG RX REV CODE 636 W/ 250 OVERRIDE (IP): Performed by: EMERGENCY MEDICINE

## 2019-01-01 PROCEDURE — 81001 URINALYSIS AUTO W/SCOPE: CPT

## 2019-01-01 PROCEDURE — 83605 ASSAY OF LACTIC ACID: CPT

## 2019-01-01 PROCEDURE — 87086 URINE CULTURE/COLONY COUNT: CPT

## 2019-01-01 PROCEDURE — 71045 X-RAY EXAM CHEST 1 VIEW: CPT

## 2019-01-01 PROCEDURE — 770006 HCHG ROOM/CARE - MED/SURG/GYN SEMI*

## 2019-01-01 PROCEDURE — 85025 COMPLETE CBC W/AUTO DIFF WBC: CPT

## 2019-01-01 PROCEDURE — 99223 1ST HOSP IP/OBS HIGH 75: CPT | Performed by: INTERNAL MEDICINE

## 2019-01-01 PROCEDURE — 36415 COLL VENOUS BLD VENIPUNCTURE: CPT

## 2019-01-01 PROCEDURE — 700105 HCHG RX REV CODE 258: Performed by: EMERGENCY MEDICINE

## 2019-01-01 RX ORDER — NITROFURANTOIN 25; 75 MG/1; MG/1
100 CAPSULE ORAL 2 TIMES DAILY
Status: ON HOLD | COMMUNITY
End: 2019-01-02

## 2019-01-01 RX ORDER — AMOXICILLIN 250 MG
2 CAPSULE ORAL 2 TIMES DAILY
Status: DISCONTINUED | OUTPATIENT
Start: 2019-01-01 | End: 2019-01-02 | Stop reason: HOSPADM

## 2019-01-01 RX ORDER — SODIUM CHLORIDE 9 MG/ML
1000 INJECTION, SOLUTION INTRAVENOUS
Status: DISCONTINUED | OUTPATIENT
Start: 2019-01-01 | End: 2019-01-02 | Stop reason: HOSPADM

## 2019-01-01 RX ORDER — POLYETHYLENE GLYCOL 3350 17 G/17G
1 POWDER, FOR SOLUTION ORAL DAILY
Status: DISCONTINUED | OUTPATIENT
Start: 2019-01-02 | End: 2019-01-02 | Stop reason: HOSPADM

## 2019-01-01 RX ORDER — KETOCONAZOLE 20 MG/ML
SHAMPOO TOPICAL
Status: DISCONTINUED | OUTPATIENT
Start: 2019-01-01 | End: 2019-01-01

## 2019-01-01 RX ORDER — AMLODIPINE BESYLATE 5 MG/1
5 TABLET ORAL EVERY MORNING
Status: DISCONTINUED | OUTPATIENT
Start: 2019-01-02 | End: 2019-01-02 | Stop reason: HOSPADM

## 2019-01-01 RX ORDER — ACETAMINOPHEN 325 MG/1
650 TABLET ORAL EVERY 6 HOURS PRN
Status: DISCONTINUED | OUTPATIENT
Start: 2019-01-01 | End: 2019-01-02 | Stop reason: HOSPADM

## 2019-01-01 RX ORDER — ONDANSETRON 2 MG/ML
4 INJECTION INTRAMUSCULAR; INTRAVENOUS ONCE
Status: COMPLETED | OUTPATIENT
Start: 2019-01-01 | End: 2019-01-01

## 2019-01-01 RX ORDER — ALPRAZOLAM 0.25 MG/1
0.25 TABLET ORAL EVERY 4 HOURS PRN
Status: DISCONTINUED | OUTPATIENT
Start: 2019-01-01 | End: 2019-01-02 | Stop reason: HOSPADM

## 2019-01-01 RX ORDER — SODIUM CHLORIDE 9 MG/ML
500 INJECTION, SOLUTION INTRAVENOUS
Status: DISCONTINUED | OUTPATIENT
Start: 2019-01-01 | End: 2019-01-02 | Stop reason: HOSPADM

## 2019-01-01 RX ORDER — POLYETHYLENE GLYCOL 3350 17 G/17G
1 POWDER, FOR SOLUTION ORAL
Status: DISCONTINUED | OUTPATIENT
Start: 2019-01-01 | End: 2019-01-02 | Stop reason: HOSPADM

## 2019-01-01 RX ORDER — SODIUM CHLORIDE 9 MG/ML
1000 INJECTION, SOLUTION INTRAVENOUS ONCE
Status: COMPLETED | OUTPATIENT
Start: 2019-01-01 | End: 2019-01-01

## 2019-01-01 RX ORDER — DOCUSATE SODIUM 100 MG/1
100 CAPSULE, LIQUID FILLED ORAL
Status: DISCONTINUED | OUTPATIENT
Start: 2019-01-01 | End: 2019-01-02 | Stop reason: HOSPADM

## 2019-01-01 RX ORDER — AMOXICILLIN 250 MG
1 CAPSULE ORAL DAILY
Status: DISCONTINUED | OUTPATIENT
Start: 2019-01-02 | End: 2019-01-01

## 2019-01-01 RX ORDER — OMEPRAZOLE 20 MG/1
20 CAPSULE, DELAYED RELEASE ORAL DAILY
Status: DISCONTINUED | OUTPATIENT
Start: 2019-01-02 | End: 2019-01-02 | Stop reason: HOSPADM

## 2019-01-01 RX ORDER — LISINOPRIL 10 MG/1
30 TABLET ORAL EVERY MORNING
Status: DISCONTINUED | OUTPATIENT
Start: 2019-01-02 | End: 2019-01-02 | Stop reason: HOSPADM

## 2019-01-01 RX ORDER — SODIUM CHLORIDE 9 MG/ML
INJECTION, SOLUTION INTRAVENOUS CONTINUOUS
Status: DISCONTINUED | OUTPATIENT
Start: 2019-01-01 | End: 2019-01-02

## 2019-01-01 RX ORDER — BISACODYL 10 MG
10 SUPPOSITORY, RECTAL RECTAL
Status: DISCONTINUED | OUTPATIENT
Start: 2019-01-01 | End: 2019-01-02 | Stop reason: HOSPADM

## 2019-01-01 RX ADMIN — SODIUM CHLORIDE 3 G: 900 INJECTION INTRAVENOUS at 17:17

## 2019-01-01 RX ADMIN — SODIUM CHLORIDE 1000 ML: 9 INJECTION, SOLUTION INTRAVENOUS at 16:46

## 2019-01-01 RX ADMIN — ONDANSETRON 4 MG: 2 INJECTION INTRAMUSCULAR; INTRAVENOUS at 17:21

## 2019-01-01 ASSESSMENT — PAIN SCALES - GENERAL: PAINLEVEL_OUTOF10: ASSUMED PAIN PRESENT

## 2019-01-02 VITALS
HEART RATE: 62 BPM | WEIGHT: 111.11 LBS | RESPIRATION RATE: 16 BRPM | BODY MASS INDEX: 21.7 KG/M2 | OXYGEN SATURATION: 93 % | SYSTOLIC BLOOD PRESSURE: 107 MMHG | TEMPERATURE: 97.5 F | DIASTOLIC BLOOD PRESSURE: 43 MMHG

## 2019-01-02 PROBLEM — A41.9 SEPSIS (HCC): Status: RESOLVED | Noted: 2019-01-01 | Resolved: 2019-01-02

## 2019-01-02 LAB — LACTATE BLD-SCNC: 2.4 MMOL/L (ref 0.5–2)

## 2019-01-02 PROCEDURE — 99239 HOSP IP/OBS DSCHRG MGMT >30: CPT | Performed by: HOSPITALIST

## 2019-01-02 PROCEDURE — 700105 HCHG RX REV CODE 258: Performed by: INTERNAL MEDICINE

## 2019-01-02 PROCEDURE — 94760 N-INVAS EAR/PLS OXIMETRY 1: CPT

## 2019-01-02 PROCEDURE — 36415 COLL VENOUS BLD VENIPUNCTURE: CPT

## 2019-01-02 PROCEDURE — 83605 ASSAY OF LACTIC ACID: CPT

## 2019-01-02 PROCEDURE — 92610 EVALUATE SWALLOWING FUNCTION: CPT

## 2019-01-02 RX ORDER — AMOXICILLIN AND CLAVULANATE POTASSIUM 875; 125 MG/1; MG/1
1 TABLET, FILM COATED ORAL 2 TIMES DAILY
Qty: 14 TAB | Refills: 0 | Status: SHIPPED | OUTPATIENT
Start: 2019-01-02 | End: 2019-01-09

## 2019-01-02 RX ORDER — METRONIDAZOLE 500 MG/1
500 TABLET ORAL EVERY 8 HOURS
Status: DISCONTINUED | OUTPATIENT
Start: 2019-01-02 | End: 2019-01-02 | Stop reason: HOSPADM

## 2019-01-02 RX ADMIN — SODIUM CHLORIDE: 9 INJECTION, SOLUTION INTRAVENOUS at 01:02

## 2019-01-02 ASSESSMENT — COPD QUESTIONNAIRES
DURING THE PAST 4 WEEKS HOW MUCH DID YOU FEEL SHORT OF BREATH: NONE/LITTLE OF THE TIME
HAVE YOU SMOKED AT LEAST 100 CIGARETTES IN YOUR ENTIRE LIFE: NO/DON'T KNOW
DO YOU EVER COUGH UP ANY MUCUS OR PHLEGM?: NO/ONLY WITH OCCASIONAL COLDS OR INFECTIONS
IN THE PAST 12 MONTHS DO YOU DO LESS THAN YOU USED TO BECAUSE OF YOUR BREATHING PROBLEMS: DISAGREE/UNSURE
COPD SCREENING SCORE: 2

## 2019-01-02 ASSESSMENT — COGNITIVE AND FUNCTIONAL STATUS - GENERAL
MOVING TO AND FROM BED TO CHAIR: UNABLE
TOILETING: TOTAL
EATING MEALS: A LITTLE
SUGGESTED CMS G CODE MODIFIER MOBILITY: CN
PERSONAL GROOMING: A LOT
HELP NEEDED FOR BATHING: TOTAL
SUGGESTED CMS G CODE MODIFIER DAILY ACTIVITY: CL
DRESSING REGULAR LOWER BODY CLOTHING: TOTAL
CLIMB 3 TO 5 STEPS WITH RAILING: TOTAL
MOVING FROM LYING ON BACK TO SITTING ON SIDE OF FLAT BED: UNABLE
STANDING UP FROM CHAIR USING ARMS: TOTAL
DRESSING REGULAR UPPER BODY CLOTHING: TOTAL
WALKING IN HOSPITAL ROOM: TOTAL
TURNING FROM BACK TO SIDE WHILE IN FLAT BAD: UNABLE
MOBILITY SCORE: 6
DAILY ACTIVITIY SCORE: 9

## 2019-01-02 ASSESSMENT — LIFESTYLE VARIABLES
EVER_SMOKED: NEVER
ALCOHOL_USE: NO
EVER_SMOKED: NEVER

## 2019-01-02 NOTE — H&P
"Hospital Medicine History & Physical Note    Date of Service  1/1/2019    Primary Care Physician  Denver J Miller, M.D.    Consultants      Code Status  When asked about code status she says \"she will think about it and wants us to ask later\". She will therefore be full code for now.    Chief Complaint  Vomiting and Fever      History of Presenting Illness  89 y.o. female who presented 1/1/2019 with Vomiting and Fever    Severe dementia cannot give story.   From assisted living.  She \"cannot remember\" why she is here but is otherwise stable and on her baseline mentations. She does not feel feverish and denies swallowing issues.  She seemed comfortable at the emergency room, not nauseous.  I was told by EDP that she had vomited and concern for aspiration. She was also being treated for UTI, hasn't completed course yet.    At the ED, one episode of fever, hemodynamically stable.   CXR looks clear to me  No leukocytosis. Mild hypokalemia. Lactic acid was 3.3. That went down to 2.4.  U/A showed evidence but not impressive for, UTI  When I saw her at ED, no acute distress. Severe cognitive deficits. Frail and elderly. No tenderness on palpation. Auscultation clear.    Review of Systems  Review of Systems   Unable to perform ROS: Mental acuity       Past Medical History   has a past medical history of Acute renal failure (ARF) ([facility]) (3/1/2015); Cancer ([facility]); Dementia, old age; Diabetes mellitus type 2 in obese ([facility]) (8/15/2013); Hearing disorder; Hypertension; Macular degeneration; MEDICAL HOME; and UTI (urinary tract infection) (2/19/2015). She also has no past medical history of ASTHMA; CAD (coronary artery disease); Congestive heart failure ([facility]); COPD; Liver disease; Seizure disorder ([facility]); or Stroke ([facility]).    Surgical History   has a past surgical history that includes hysterectomy, total abdominal; other abdominal surgery; other; and hip hemiarthroplasty (2/21/2015).     Family History  family history includes " Cancer in her father; Psychiatry in her brother.     Social History   reports that she has never smoked. She has never used smokeless tobacco. She reports that she does not drink alcohol or use drugs.    Allergies  Allergies   Allergen Reactions   • Sulfa Drugs        Medications  Prior to Admission Medications   Prescriptions Last Dose Informant Patient Reported? Taking?   ALPRAZolam (XANAX) 0.25 MG Tab PRN at PRN MAR from Other Facility Yes No   Sig: Take 0.25 mg by mouth every four hours as needed for Anxiety (pacing/anxiety/tearful).   amlodipine (NORVASC) 5 MG TABS 1/1/2019 at 0800 MAR from Other Facility Yes No   Sig: Take 5 mg by mouth every morning.   apixaban (ELIQUIS) 2.5mg Tab 1/1/2019 at 0800 MAR from Other Facility Yes No   Sig: Take 2.5 mg by mouth 2 Times a Day.   docusate sodium (COLACE) 100 MG Cap PRN at PRN MAR from Other Facility Yes No   Sig: Take 100 mg by mouth 1 time daily as needed for Constipation (hard stools & constipation).   ketoconazole (NIZORAL) 2 % shampoo UNK at UNK MAR from Other Facility Yes No   Sig: Apply  to affected area(s) 2X A WEEK.   lisinopril (PRINIVIL, ZESTRIL) 30 MG tablet 1/1/2019 at 0800 MAR from Other Facility Yes No   Sig: Take 30 mg by mouth every morning.   nitrofurantoin monohydr macro (MACROBID) 100 MG Cap 1/1/2019 at 0800 MAR from Other Facility Yes Yes   Sig: Take 100 mg by mouth 2 times a day. 7 DAYS   omeprazole (PRILOSEC) 20 MG delayed-release capsule 1/1/2019 at 0800 MAR from Other Facility Yes No   Sig: Take 20 mg by mouth every day.   polyethylene glycol/lytes (MIRALAX) Pack 1/1/2019 at 0800 MAR from Other Facility Yes No   Sig: Take 17 g by mouth every day.   senna-docusate (PERICOLACE OR SENOKOT S) 8.6-50 MG Tab 1/1/2019 at 0800 MAR from Other Facility Yes No   Sig: Take 1 Tab by mouth every day.      Facility-Administered Medications: None       Physical Exam  Temp:  [38.1 °C (100.6 °F)] 38.1 °C (100.6 °F)  Pulse:  [] 88  Resp:  [15-21] 16  BP:  (126)/(46) 126/46    Physical Exam   Constitutional: She appears well-developed and well-nourished.   Elderly, frail, thin   HENT:   Head: Normocephalic and atraumatic.   Eyes: Conjunctivae and EOM are normal. No scleral icterus.   Neck: Normal range of motion. Neck supple.   Cardiovascular: Exam reveals no gallop and no friction rub.    Murmur heard.  Regular to me   Pulmonary/Chest: Effort normal and breath sounds normal. No respiratory distress. She has no wheezes. She has no rales.   Abdominal: Soft. Bowel sounds are normal. She exhibits no distension. There is no tenderness. There is no rebound and no guarding.   Musculoskeletal: She exhibits no edema or tenderness.   Neurological: She is alert.   Severe cognitive deficits.   Skin: Skin is warm.   Psychiatric:   Not agitated.       Laboratory:  Recent Labs      01/01/19   1621   WBC  9.3   RBC  4.70   HEMOGLOBIN  13.8   HEMATOCRIT  42.1   MCV  89.6   MCH  29.4   MCHC  32.8*   RDW  44.4   PLATELETCT  264   MPV  9.2     Recent Labs      01/01/19   1621   SODIUM  137   POTASSIUM  3.3*   CHLORIDE  107   CO2  21   GLUCOSE  134*   BUN  16   CREATININE  0.79   CALCIUM  9.3     Recent Labs      01/01/19   1621   ALTSGPT  40   ASTSGOT  76*   ALKPHOSPHAT  98   TBILIRUBIN  0.5   GLUCOSE  134*                 No results for input(s): TROPONINI in the last 72 hours.    Urinalysis:    Recent Labs      01/01/19   1728   SPECGRAVITY  1.009   GLUCOSEUR  Negative   KETONES  Negative   NITRITE  Negative   LEUKESTERAS  Small*   WBCURINE  5-10*   RBCURINE  5-10*   BACTERIA  Negative   EPITHELCELL  Negative        Imaging:  DX-CHEST-PORTABLE (1 VIEW)   Final Result      No acute cardiac or pulmonary abnormality is noted.            Assessment/Plan:  I anticipate this patient will require at least two midnights for appropriate medical management, necessitating inpatient admission.    * Sepsis from aspiration pneumonia and UTI ([facility])   Assessment & Plan    This is sepsis (without  associated acute organ dysfunction).   Findings not impressive for UTI or aspiration pneumonia  Did have fever, episode of vomting on report and was completeing antibiotics for UTI in the outpatient.  IVF, IV antibiotics  Follow cultures and procalcitonin     Paroxysmal atrial fibrillation (HCC)- (present on admission)   Assessment & Plan    Currently stable  Continue Eliquis     UTI (urinary tract infection)- (present on admission)   Assessment & Plan    On IV antibiotics  Follow urine culture     Hypertension- (present on admission)   Assessment & Plan    Normal  Continue bloo dpressure medications with hold parameters.     Aspiration pneumonia (HCC)   Assessment & Plan    Because of episode of vomiting  CXR not impressive  IV antibiotics  Follow procalcitonin  Speech to evaluate     Lactic acidosis- (present on admission)   Assessment & Plan    May not be related to sepsis  Trend, give IVF     History of pulmonary embolism- (present on admission)   Assessment & Plan    On Eliquis. Continue.     Frequent falls- (present on admission)   Assessment & Plan    Noted  PT/OT ordered         VTE prophylaxis: Eliquis  Reviewed vitals, labs, imaging, staff notes.  Discussed assessment and plan with Daysi Farmer  Discussed with ED physician.

## 2019-01-02 NOTE — ASSESSMENT & PLAN NOTE
This is sepsis (without associated acute organ dysfunction).   Findings not impressive for UTI or aspiration pneumonia  Did have fever, episode of vomting on report and was completeing antibiotics for UTI in the outpatient.  IVF, IV antibiotics  Follow cultures and procalcitonin

## 2019-01-02 NOTE — CARE PLAN
Problem: Safety  Goal: Will remain free from injury  Outcome: PROGRESSING AS EXPECTED  Bed alarm on for safety and assistance out of bed    Problem: Discharge Barriers/Planning  Goal: Patient's continuum of care needs will be met  Outcome: PROGRESSING AS EXPECTED  Came from [facility] hospice    Problem: Fluid Volume:  Goal: Will maintain balanced intake and output  Outcome: PROGRESSING AS EXPECTED  ivf for hydration

## 2019-01-02 NOTE — DISCHARGE PLANNING
Anticipated Discharge Disposition: return to [facility].    Action: Spoke to Allison and Anila at [place] to notify them that pt is returning to their facility via [facility]. Transportation form and PCS form completed and faxed to [place] for a   at 16:00.     Barriers to Discharge: set up transportation    Plan: [facility]  16:00.

## 2019-01-02 NOTE — DISCHARGE INSTRUCTIONS
Discharge Instructions    Discharged to [facility] home by medical transportation with escort. Discharged via ambulance, hospital escort: Yes.  Special equipment needed: Not Applicable    Be sure to schedule a follow-up appointment with your primary care doctor or any specialists as instructed.     Discharge Plan:   Influenza Vaccine Indication: Not indicated: Previously immunized this influenza season and > 8 years of age    I understand that a diet low in cholesterol, fat, and sodium is recommended for good health. Unless I have been given specific instructions below for another diet, I accept this instruction as my diet prescription.   Other diet: Regular    Special Instructions: Sepsis, Adult  Sepsis is a serious infection of your blood or tissues that affects your whole body. The infection that causes sepsis may be bacterial, viral, fungal, or parasitic. Sepsis may be life threatening. Sepsis can cause your blood pressure to drop. This may result in shock. Shock causes your central nervous system and your organs to stop working correctly.  What increases the risk?  Sepsis can happen in anyone, but it is more likely to happen in people who have weakened immune systems.  What are the signs or symptoms?  Symptoms of sepsis can include:  · Fever or low body temperature (hypothermia).  · Rapid breathing (hyperventilation).  · Chills.  · Rapid heartbeat (tachycardia).  · Confusion or light-headedness.  · Trouble breathing.  · Urinating much less than usual.  · Cool, clammy skin or red, flushed skin.  · Other problems with the heart, kidneys, or brain.  How is this diagnosed?  Your health care provider will likely do tests to look for an infection, to see if the infection has spread to your blood, and to see how serious your condition is. Tests can include:  · Blood tests, including cultures of your blood.  · Cultures of other fluids from your body, such as:  ¨ Urine.  ¨ Pus from wounds.  ¨ Mucus coughed up from your  lungs.  · Urine tests other than cultures.  · X-ray exams or other imaging tests.  How is this treated?  Treatment will begin with elimination of the source of infection. If your sepsis is likely caused by a bacterial or fungal infection, you will be given antibiotic or antifungal medicines.  You may also receive:  · Oxygen.  · Fluids through an IV tube.  · Medicines to increase your blood pressure.  · A machine to clean your blood (dialysis) if your kidneys fail.  · A machine to help you breathe if your lungs fail.  Get help right away if:  You get an infection or develop any of the signs and symptoms of sepsis after surgery or a hospitalization.  This information is not intended to replace advice given to you by your health care provider. Make sure you discuss any questions you have with your health care provider.  Document Released: 09/15/2004 Document Revised: 05/25/2017 Document Reviewed: 08/25/2014  CURRENT Interactive Patient Education © 2017 [employer]      · Is patient discharged on Warfarin / Coumadin?   No     Depression / Suicide Risk    As you are discharged from this [facility] facility, it is important to learn how to keep safe from harming yourself.    Recognize the warning signs:  · Abrupt changes in personality, positive or negative- including increase in energy   · Giving away possessions  · Change in eating patterns- significant weight changes-  positive or negative  · Change in sleeping patterns- unable to sleep or sleeping all the time   · Unwillingness or inability to communicate  · Depression  · Unusual sadness, discouragement and loneliness  · Talk of wanting to die  · Neglect of personal appearance   · Rebelliousness- reckless behavior  · Withdrawal from people/activities they love  · Confusion- inability to concentrate     If you or a loved one observes any of these behaviors or has concerns about self-harm, here's what you can do:  · Talk about it- your feelings and reasons for  harming yourself  · Remove any means that you might use to hurt yourself (examples: pills, rope, extension cords, firearm)  · Get professional help from the community (Mental Health, Substance Abuse, psychological counseling)  · Do not be alone:Call your Safe Contact- someone whom you trust who will be there for you.  · Call your local CRISIS HOTLINE 311-8553 or 068-752-9892  · Call your local Children's Mobile Crisis Response Team [place] (099) 908-2796 or www.PartTec  · Call the toll free National Suicide Prevention Hotlines   · National Suicide Prevention Lifeline 157-894-QHMD (6605)  · National Hope Line Network 800-SUICIDE (116-4115)

## 2019-01-02 NOTE — ED NOTES
Spoke with med tech from [facility] and per the med tech pt had a reaction of vomiting last time she was on macrobid abx.

## 2019-01-02 NOTE — PROGRESS NOTES
Received change of shift report from Hanna W[place]. Patient is resting in bed. Nurse let me know patient is supposed to be DNR and physician needs to address. Also nurse changed cortes at 0630. No urine as of yet.

## 2019-01-02 NOTE — THERAPY
"Speech Language Therapy Clinical Swallow Evaluation completed.  Functional Status: The patient was seen for clinical swallow evalaution this date. The patient was awake, alert and oriented to self only. Patient sleeping upon entering room and reported feeling \"tired\" but easily woke up for assessment and maintained adequate alertness through out session. The patient was seen with her regular/thin liquid meal tray with additional PO trials for assessment purposes. The patient required mod A for self feeding with question of visual difficulties. The patient consumed Regular/thin liquid meal items with no overt s/s of aspiration. Mild oral holding noted between bites but patient cleared residue independently. At this time, recommend patient continue on regular diet with chopped meats/entrees to assist with self feeding. At this time, no further SLP services are indicated. Please re-consult with any difficulty or change in status. Thank you.     Recommendations - Diet: Regular, Thin Liquid (chopped entrees/meats for self feeding)                          Strategies: Monitor during meals and Head of Bed at 90 Degrees                          Medication Administration: Crush all Medications in Puree    Plan of Care: Patient with no further skilled SLP needs in the acute care setting at this time  Post-Acute Therapy: Currently anticipate no further skilled therapy needs once patient is discharged from the inpatient setting.    See \"Rehab Therapy-Acute\" Patient Summary Report for complete documentation.   "

## 2019-01-02 NOTE — PROGRESS NOTES
Admit from the ER; baseline dementia and alert to herself and birthdate- bed alarm placed and close to the nurses station for fall history. Per daughter aakash baugh, she has a polst form and hospice at [facility] the [facility] (scanned in chart) even though she is listed as a full code- dr baca made aware in attempts to change her code status and to dc her iv abx as her polst and daughter states. Per MD hold her iv abx as daughter has requested and MD in the morning can adjust her code. ivf started, cortes in place , will make her NPO considering she is weak, lethargic, hardly keeps her eyes open and weak cough noted.  Heels red with very slow blanching noted (heel boots placed), elbows red/bruising and scab, ankles red and blanching, ears red, hips red and blanching as well as her sacral area. Inner vagina tract red as where her cortes cath tube sits. Stat lock placed. Waffle cushion and meplix would benefit her  Cortes replaced per policy ; order still needed - will notify day shift rn

## 2019-01-02 NOTE — ED NOTES
Pt cleaned. Pt did arrive with cortes in place. Arlene care provided. Redness noted to buttocks/vagina.

## 2019-01-02 NOTE — ASSESSMENT & PLAN NOTE
Because of episode of vomiting  CXR not impressive  IV antibiotics  Follow procalcitonin  Speech to evaluate

## 2019-01-02 NOTE — ED NOTES
Transport here to take pt to the floor. All belongings accounted for. Family at bedside for transfer.

## 2019-01-02 NOTE — ED PROVIDER NOTES
ED Provider Note  CHIEF COMPLAINT  Chief Complaint   Patient presents with   • Vomiting   • Fever       HPI  Daysi Farmer is a 89 y.o. female who presents for evaluation of vomiting fever.  The patient was sent to the emergency department from the [facility] the [facility] I believe.  She was observed by staff to be vomiting they were concerned that she may have aspiration.  On arrival here she has a low-grade fever, tachycardia and Sirs criteria.  History is unobtainable from patient due to condition.  She is drowsy and states she wants to take a nap does not cooperate with history attempts.  She currently has been treated for urinary tract infection I believe with Macrobid    REVIEW OF SYSTEMS  See HPI for further details.  Unobtainable due to condition    PAST MEDICAL HISTORY  Past Medical History:   Diagnosis Date   • Acute renal failure (ARF) ([facility]) 3/1/2015   • Cancer ([facility])     skin Ca removed   • Dementia, old age    • Diabetes mellitus type 2 in obese ([facility]) 8/15/2013   • Hearing disorder    • Hypertension    • Macular degeneration    • MEDICAL HOME    • UTI (urinary tract infection) 2/19/2015       FAMILY HISTORY  Family History   Problem Relation Age of Onset   • Cancer Father    • Psychiatry Brother        SOCIAL HISTORY  Social History     Social History   • Marital status:      Spouse name: N/A   • Number of children: N/A   • Years of education: N/A     Social History Main Topics   • Smoking status: Never Smoker   • Smokeless tobacco: Never Used   • Alcohol use No   • Drug use: No   • Sexual activity: Not on file     Other Topics Concern   • Not on file     Social History Narrative   • No narrative on file       SURGICAL HISTORY  Past Surgical History:   Procedure Laterality Date   • HIP HEMIARTHROPLASTY  2/21/2015    Performed by Brian Calixto M.D. at [facility]   • HYSTERECTOMY, TOTAL ABDOMINAL     • OTHER      TONSILLS, EAR SURG   • OTHER ABDOMINAL SURGERY          CURRENT MEDICATIONS  Home Medications    **Home medications have not yet been reviewed for this encounter**          ALLERGIES  Allergies   Allergen Reactions   • Sulfa Drugs        PHYSICAL EXAM  VITAL SIGNS: /46   Pulse 99   Temp (!) 38.1 °C (100.6 °F) (Temporal)   Resp 18   Wt 52 kg (114 lb 10.2 oz)   LMP 12/24/1960   SpO2 96%   BMI 22.39 kg/m²   Constitutional :  Well developed, thin elderly female non-toxic appearance.   HENT: Head is atraumatic normocephalic dry mucous membranes some dried emesis is noted to her hair  Eyes: Normal-appearing nonicteric  Neck: Normal range of motion, No tenderness, Supple, No stridor.   Lymphatic: No cervical or regional adenopathy is noted.   Cardiovascular: Borderline tachycardia, Normal rhythm, No murmurs, No rubs, No gallops.   Thorax & Lungs: Decreased breath sounds bilaterally no rales rhonchi auscultated  Skin: Warm, Dry, No erythema, No rash.  Poor skin turgor  Abdomen is soft there is no distention there does not appear to be any tenderness with palpation of the abdomen  Neurologically she is arousable sleepy no focal neurological findings are noted  Extremities no cyanosis or edema  Spine nontender thoracic and lumbar area      Results for orders placed or performed during the hospital encounter of 01/01/19   Lactic acid (lactate)   Result Value Ref Range    Lactic Acid 3.3 (H) 0.5 - 2.0 mmol/L   CBC WITH DIFFERENTIAL   Result Value Ref Range    WBC 9.3 4.8 - 10.8 K/uL    RBC 4.70 4.20 - 5.40 M/uL    Hemoglobin 13.8 12.0 - 16.0 g/dL    Hematocrit 42.1 37.0 - 47.0 %    MCV 89.6 81.4 - 97.8 fL    MCH 29.4 27.0 - 33.0 pg    MCHC 32.8 (L) 33.6 - 35.0 g/dL    RDW 44.4 35.9 - 50.0 fL    Platelet Count 264 164 - 446 K/uL    MPV 9.2 9.0 - 12.9 fL    Neutrophils-Polys 97.80 (H) 44.00 - 72.00 %    Lymphocytes 0.60 (L) 22.00 - 41.00 %    Monocytes 0.60 0.00 - 13.40 %    Eosinophils 0.30 0.00 - 6.90 %    Basophils 0.40 0.00 - 1.80 %    Immature Granulocytes 0.30  0.00 - 0.90 %    Nucleated RBC 0.00 /100 WBC    Neutrophils (Absolute) 9.08 (H) 2.00 - 7.15 K/uL    Lymphs (Absolute) 0.06 (L) 1.00 - 4.80 K/uL    Monos (Absolute) 0.06 0.00 - 0.85 K/uL    Eos (Absolute) 0.03 0.00 - 0.51 K/uL    Baso (Absolute) 0.04 0.00 - 0.12 K/uL    Immature Granulocytes (abs) 0.03 0.00 - 0.11 K/uL    NRBC (Absolute) 0.00 K/uL   COMP METABOLIC PANEL   Result Value Ref Range    Sodium 137 135 - 145 mmol/L    Potassium 3.3 (L) 3.6 - 5.5 mmol/L    Chloride 107 96 - 112 mmol/L    Co2 21 20 - 33 mmol/L    Anion Gap 9.0 0.0 - 11.9    Glucose 134 (H) 65 - 99 mg/dL    Bun 16 8 - 22 mg/dL    Creatinine 0.79 0.50 - 1.40 mg/dL    Calcium 9.3 8.5 - 10.5 mg/dL    AST(SGOT) 76 (H) 12 - 45 U/L    ALT(SGPT) 40 2 - 50 U/L    Alkaline Phosphatase 98 30 - 99 U/L    Total Bilirubin 0.5 0.1 - 1.5 mg/dL    Albumin 3.6 3.2 - 4.9 g/dL    Total Protein 6.5 6.0 - 8.2 g/dL    Globulin 2.9 1.9 - 3.5 g/dL    A-G Ratio 1.2 g/dL   URINALYSIS   Result Value Ref Range    Color Yellow     Character Clear     Specific Gravity 1.009 <1.035    Ph 6.5 5.0 - 8.0    Glucose Negative Negative mg/dL    Ketones Negative Negative mg/dL    Protein Negative Negative mg/dL    Bilirubin Negative Negative    Urobilinogen, Urine 0.2 Negative    Nitrite Negative Negative    Leukocyte Esterase Small (A) Negative    Occult Blood Trace (A) Negative    Micro Urine Req Microscopic    ESTIMATED GFR   Result Value Ref Range    GFR If African American >60 >60 mL/min/1.73 m 2    GFR If Non African American >60 >60 mL/min/1.73 m 2   URINE MICROSCOPIC (W/UA)   Result Value Ref Range    WBC 5-10 (A) /hpf    RBC 5-10 (A) /hpf    Bacteria Negative None /hpf    Epithelial Cells Negative /hpf    Hyaline Cast 0-2 /lpf         DX-CHEST-PORTABLE (1 VIEW)   Final Result      No acute cardiac or pulmonary abnormality is noted.          COURSE & MEDICAL DECISION MAKING  Pertinent Labs & Imaging studies reviewed. (See chart for details)  Patient 89-year-old female  presents with a history of vomiting based on her clinical condition of dehydration inability to take fluids because of vomiting she was hydrated with saline 1 L state of hydration improved after hydration.  The patient at this time is not stable for discharge given her age inability to take fluids and evidence of sirs criteria.  She does have elevated lactate possibly secondary to dehydration or sepsis.  The patient has been started on Unasyn per protocol for aspiration pneumonia also cover urinary tract infection  She is also received Zofran for nausea.  Etiology of her vomiting is unclear at this time she has no evidence of acute abdomen  FINAL IMPRESSION  1.  Vomiting  2.  Sepsis  3.  Urinary tract infection      Electronically signed by: Angelito Faria, 1/1/2019

## 2019-01-02 NOTE — THERAPY
OT eval attempted, pt was on hospice prior to admission, RN to double check with family if family would like to receive acute OT services, will f/u later as able.     Anupama Ingram MS OTR/L, pager # [phone]

## 2019-01-02 NOTE — ED TRIAGE NOTES
Pt BIB [facility] with c/c of vomiting today x1. Pt currently at [place] [facility] the [facility] when staff walked by and heard her vomiting. Staff reporting concern for possible aspiration. Pt is currently being treated with Macrobid for a UTI

## 2019-01-03 LAB
BACTERIA UR CULT: NORMAL
SIGNIFICANT IND 70042: NORMAL
SITE SITE: NORMAL
SOURCE SOURCE: NORMAL

## 2019-01-03 NOTE — DISCHARGE SUMMARY
Discharge Summary    CHIEF COMPLAINT ON ADMISSION  Chief Complaint   Patient presents with   • Vomiting   • Fever       Reason for Admission  EMS     Admission Date  1/1/2019    CODE STATUS  DNAR/DNI    HPI & HOSPITAL COURSE  This is a 89 y.o. female here with chief complaint of emesis and possible aspiration.  The patient is currently on hospice care at [facility] ([facility] care unit) with [facility].  Per family patient was recently diagnosed with urinary tract infection and was treated with nitrofurantoin and had taken several doses, much like her November presentation, but became progressively weaker with associated nausea, vomiting, and incontinence of stool. Patient ws felt to have possibly aspirated some contents and patient was subsequently transferred to [facility] emergency room for further treatment/management.  While in the emergency room patient was noted to have mildly elevated lactic acid, and was presented for admission.  Unfortunately pulsed paperwork was not on hand at the time of admission, patient was made full code and provided with parenteral fluids and antibiotics.  On the morning of hospital day 1, conferring with patient's son, he was confirmed to be on hospice care and did have a POLST on file and stated she is only to receive oral medications and to be on comfort care, and no IV antibiotics but oral antibiotics okay.  Thus, patient's daughter expressed no other measures to prolong her life are to be undertaken.  Therefore, patient was discharged back to [facility] to continue oral antibiotics under hospice care.  Upon discharge patient with normal vital signs minimal complaints of cough and states no nausea or vomiting, and tolerating oral intake well.  Discussed with patient's daughter overall care and plan for discharge, and agreed.   Patient will be sent home on augmentin 875 mg twice daily for an additional 7 days.  Recommendations for patient to be followed up with hospice physician for  follow-up of current treatment.  Therefore, she is discharged in good and stable condition to skilled nursing facility.  The patient recovered much more quickly than anticipated on admission.       Discharge Date  1/2/2019    FOLLOW UP ITEMS POST DISCHARGE  With hospice staff.     DISCHARGE DIAGNOSES  Principal Problem (Resolved):    Sepsis from aspiration pneumonia and UTI (HCC) POA: Unknown  Active Problems:    Hypertension POA: Yes    UTI (urinary tract infection) POA: Yes    Paroxysmal atrial fibrillation (HCC) POA: Yes    Frequent falls POA: Yes    History of pulmonary embolism POA: Yes    Aspiration pneumonia (HCC) POA: Unknown    Chronic anticoagulation POA: Unknown  Resolved Problems:    Lactic acidosis POA: Yes      FOLLOW UP  No future appointments.  No follow-up provider specified.    MEDICATIONS ON DISCHARGE     Medication List      START taking these medications      Instructions   amoxicillin-clavulanate 875-125 MG Tabs  Commonly known as:  AUGMENTIN   Take 1 Tab by mouth 2 times a day for 7 days.  Dose:  1 Tab        CONTINUE taking these medications      Instructions   ALPRAZolam 0.25 MG Tabs  Commonly known as:  XANAX   Take 0.25 mg by mouth every four hours as needed for Anxiety (pacing/anxiety/tearful).  Dose:  0.25 mg     amLODIPine 5 MG Tabs  Commonly known as:  NORVASC   Take 5 mg by mouth every morning.  Dose:  5 mg     docusate sodium 100 MG Caps  Commonly known as:  COLACE   Take 100 mg by mouth 1 time daily as needed for Constipation (hard stools & constipation).  Dose:  100 mg     ELIQUIS 2.5mg Tabs  Generic drug:  apixaban   Take 2.5 mg by mouth 2 Times a Day.  Dose:  2.5 mg     ketoconazole 2 % shampoo  Commonly known as:  NIZORAL   Apply  to affected area(s) 2X A WEEK.     lisinopril 30 MG tablet  Commonly known as:  PRINIVIL, ZESTRIL   Take 30 mg by mouth every morning.  Dose:  30 mg     omeprazole 20 MG delayed-release capsule  Commonly known as:  PRILOSEC   Take 20 mg by mouth every  day.  Dose:  20 mg     polyethylene glycol/lytes Pack  Commonly known as:  MIRALAX   Take 17 g by mouth every day.  Dose:  17 g     senna-docusate 8.6-50 MG Tabs  Commonly known as:  PERICOLACE or SENOKOT S   Take 1 Tab by mouth every day.  Dose:  1 Tab        STOP taking these medications    nitrofurantoin monohydr macro 100 MG Caps  Commonly known as:  MACROBID            Allergies  Allergies   Allergen Reactions   • Sulfa Drugs        DIET  Orders Placed This Encounter   Procedures   • Diet Order Regular     Standing Status:   Standing     Number of Occurrences:   1     Order Specific Question:   Diet:     Answer:   Regular [1]     Order Specific Question:   Texture/Fiber modifications:     Answer:   Chopped Meat [5]       ACTIVITY  As tolerated.  Weight bearing as tolerated    CONSULTATIONS  None    PROCEDURES  None    LABORATORY  Lab Results   Component Value Date    SODIUM 137 01/01/2019    POTASSIUM 3.3 (L) 01/01/2019    CHLORIDE 107 01/01/2019    CO2 21 01/01/2019    GLUCOSE 134 (H) 01/01/2019    BUN 16 01/01/2019    CREATININE 0.79 01/01/2019    CREATININE 0.9 11/29/2008        Lab Results   Component Value Date    WBC 9.3 01/01/2019    HEMOGLOBIN 13.8 01/01/2019    HEMATOCRIT 42.1 01/01/2019    PLATELETCT 264 01/01/2019        Total time of the discharge process exceeds 39 minutes.

## 2019-01-06 LAB
BACTERIA BLD CULT: NORMAL
BACTERIA BLD CULT: NORMAL
SIGNIFICANT IND 70042: NORMAL
SIGNIFICANT IND 70042: NORMAL
SITE SITE: NORMAL
SITE SITE: NORMAL
SOURCE SOURCE: NORMAL
SOURCE SOURCE: NORMAL

## 2021-01-14 DIAGNOSIS — Z23 NEED FOR VACCINATION: ICD-10-CM

## 2021-04-03 NOTE — PROGRESS NOTES
Assessment completed.  Pt A&O to self only, disoriented to time, place and situation, pt reoriented.  Pt denies any pain at this time.  Pt updated on POC, communication board updated.  Needs met, will continue to monitor    show
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
